# Patient Record
Sex: FEMALE | Race: BLACK OR AFRICAN AMERICAN | NOT HISPANIC OR LATINO | Employment: UNEMPLOYED | URBAN - METROPOLITAN AREA
[De-identification: names, ages, dates, MRNs, and addresses within clinical notes are randomized per-mention and may not be internally consistent; named-entity substitution may affect disease eponyms.]

---

## 2023-04-28 ENCOUNTER — OFFICE VISIT (OUTPATIENT)
Dept: PRIMARY CARE | Facility: CLINIC | Age: 68
End: 2023-04-28
Payer: COMMERCIAL

## 2023-04-28 VITALS
SYSTOLIC BLOOD PRESSURE: 110 MMHG | OXYGEN SATURATION: 94 % | BODY MASS INDEX: 36.99 KG/M2 | DIASTOLIC BLOOD PRESSURE: 74 MMHG | TEMPERATURE: 98.1 F | HEIGHT: 62 IN | HEART RATE: 89 BPM | WEIGHT: 201 LBS

## 2023-04-28 DIAGNOSIS — E11.69 TYPE 2 DIABETES MELLITUS WITH OTHER SPECIFIED COMPLICATION, WITHOUT LONG-TERM CURRENT USE OF INSULIN (MULTI): ICD-10-CM

## 2023-04-28 DIAGNOSIS — H66.93 BILATERAL OTITIS MEDIA, UNSPECIFIED OTITIS MEDIA TYPE: ICD-10-CM

## 2023-04-28 DIAGNOSIS — Z00.00 HEALTH CARE MAINTENANCE: Primary | ICD-10-CM

## 2023-04-28 PROCEDURE — 1036F TOBACCO NON-USER: CPT | Performed by: STUDENT IN AN ORGANIZED HEALTH CARE EDUCATION/TRAINING PROGRAM

## 2023-04-28 PROCEDURE — 99214 OFFICE O/P EST MOD 30 MIN: CPT | Performed by: STUDENT IN AN ORGANIZED HEALTH CARE EDUCATION/TRAINING PROGRAM

## 2023-04-28 PROCEDURE — 1160F RVW MEDS BY RX/DR IN RCRD: CPT | Performed by: STUDENT IN AN ORGANIZED HEALTH CARE EDUCATION/TRAINING PROGRAM

## 2023-04-28 PROCEDURE — 90750 HZV VACC RECOMBINANT IM: CPT | Performed by: STUDENT IN AN ORGANIZED HEALTH CARE EDUCATION/TRAINING PROGRAM

## 2023-04-28 PROCEDURE — 1159F MED LIST DOCD IN RCRD: CPT | Performed by: STUDENT IN AN ORGANIZED HEALTH CARE EDUCATION/TRAINING PROGRAM

## 2023-04-28 PROCEDURE — 3074F SYST BP LT 130 MM HG: CPT | Performed by: STUDENT IN AN ORGANIZED HEALTH CARE EDUCATION/TRAINING PROGRAM

## 2023-04-28 PROCEDURE — 1125F AMNT PAIN NOTED PAIN PRSNT: CPT | Performed by: STUDENT IN AN ORGANIZED HEALTH CARE EDUCATION/TRAINING PROGRAM

## 2023-04-28 PROCEDURE — 90471 IMMUNIZATION ADMIN: CPT | Performed by: STUDENT IN AN ORGANIZED HEALTH CARE EDUCATION/TRAINING PROGRAM

## 2023-04-28 PROCEDURE — 4010F ACE/ARB THERAPY RXD/TAKEN: CPT | Performed by: STUDENT IN AN ORGANIZED HEALTH CARE EDUCATION/TRAINING PROGRAM

## 2023-04-28 PROCEDURE — 3078F DIAST BP <80 MM HG: CPT | Performed by: STUDENT IN AN ORGANIZED HEALTH CARE EDUCATION/TRAINING PROGRAM

## 2023-04-28 RX ORDER — LISINOPRIL 5 MG/1
5 TABLET ORAL DAILY
COMMUNITY
End: 2023-04-28 | Stop reason: ALTCHOICE

## 2023-04-28 RX ORDER — BLOOD-GLUCOSE METER
KIT MISCELLANEOUS
COMMUNITY
Start: 2022-03-23 | End: 2023-08-15 | Stop reason: ALTCHOICE

## 2023-04-28 RX ORDER — ERYTHROMYCIN 20 MG/ML
SOLUTION TOPICAL
COMMUNITY
Start: 2022-01-06 | End: 2023-05-08 | Stop reason: SDUPTHER

## 2023-04-28 RX ORDER — DORZOLAMIDE HCL 20 MG/ML
SOLUTION/ DROPS OPHTHALMIC
COMMUNITY
Start: 2021-04-22

## 2023-04-28 RX ORDER — PREDNISOLONE ACETATE 10 MG/ML
SUSPENSION/ DROPS OPHTHALMIC
COMMUNITY
Start: 2023-04-12

## 2023-04-28 RX ORDER — LIDOCAINE 50 MG/G
PATCH TOPICAL SEE ADMIN INSTRUCTIONS
COMMUNITY
Start: 2021-04-06

## 2023-04-28 RX ORDER — GABAPENTIN 100 MG/1
100 CAPSULE ORAL 3 TIMES DAILY
COMMUNITY
Start: 2023-02-13 | End: 2023-04-28 | Stop reason: ALTCHOICE

## 2023-04-28 RX ORDER — PETROLATUM 1 G/G
OINTMENT TOPICAL
COMMUNITY
Start: 2021-05-17

## 2023-04-28 RX ORDER — AMOXICILLIN 875 MG/1
875 TABLET, FILM COATED ORAL EVERY 12 HOURS SCHEDULED
Status: SHIPPED | OUTPATIENT
Start: 2023-04-28 | End: 2023-05-03

## 2023-04-28 RX ORDER — GLIPIZIDE 5 MG/1
5 TABLET, FILM COATED, EXTENDED RELEASE ORAL DAILY
COMMUNITY
End: 2023-08-15 | Stop reason: ALTCHOICE

## 2023-04-28 RX ORDER — ACETAMINOPHEN 500 MG
50 TABLET ORAL DAILY
COMMUNITY
End: 2023-12-07 | Stop reason: SDUPTHER

## 2023-04-28 RX ORDER — BLOOD SUGAR DIAGNOSTIC
STRIP MISCELLANEOUS
COMMUNITY
Start: 2023-03-15 | End: 2023-08-15 | Stop reason: ALTCHOICE

## 2023-04-28 RX ORDER — LATANOPROST 50 UG/ML
SOLUTION/ DROPS OPHTHALMIC
COMMUNITY

## 2023-04-28 RX ORDER — METFORMIN HYDROCHLORIDE 500 MG/1
500 TABLET, EXTENDED RELEASE ORAL DAILY
COMMUNITY
End: 2023-12-04 | Stop reason: WASHOUT

## 2023-04-28 ASSESSMENT — PAIN SCALES - GENERAL: PAINLEVEL: 0-NO PAIN

## 2023-04-28 NOTE — PROGRESS NOTES
67 year old female with PMH significant for controlled T2DM, HTN, Vit D deficiency, shingles with postherpetic neuralgia, bilateral legal blindness and glaucoma of L eye is here for bl ear pain.    #Bl ear pain   - x 2 weeks   - recent cold/upper respiratory infection in the last 3 months   - OTC medication and lots of hydration: reports feeling better todays  - mild dry cough at this time   - denies discharge, tinnitus, or hearing loss  - denies N/V, F/C, SOB, chest pain, palpitation, abd pain, or change in BM    #HTN  #Dyslipidemia   - Compliant Lisinopril 5 mg QD   - Atorvastatin 40 mg   - Denies malaise, fever/chills, appetite changes, dizziness, vision changes, SOB, cough, chest pain, palpitations, edema, abdominal pain, N/V, changes in urine frequency/color, changes in stooling frequency/consistency/color.       #DMII  - A1C at 7.2 % Aug 15/22   - today am fasting glucose at home 282: but reports having lots of candy bars before bed; usually 150s  - Compliant with Metformin  mg Qd (due to GI symptoms) and Glipizide 5 mg   - Opthalmology follow up every 6 months   - Regular podiatry follow up   - Not taking Gabapentin anymore: took it only after Shingles years ago  - Today am fasting    - Denies hypoglycemic episodes   - Follows up with opthalmology and podiatry   - Neuropathy of feet; previously on 600 mg Gabapentin but has been off medication for months       REVIEW OF SYSTEMS:   No fevers, chills, weight is stable  No skin lesions  No HA, SZ, LOC  No CP, chest pressure  No cough, SOB   No ABD pain, nausea, vomiting  No urinary urgency, dysuria, urinary frequency  No BRBPR, melena, hematuria  No bleeding      PHYSICAL EXAMINATION:   Gen: NAD, non-toxic  HEENT: Bilateral tympanic membrane erythema with severe bilateral waxy   Chest: no inc WOB, CTABL, no wheeze/crackles/rhonchi   CVS: RRR, no murur appreciated   Abd: soft, NT/ND, +BS  Ext: no edema, nontender  Skin: Dry, warm, good  condition  Neuro: grossly normal, CN intact, LUIS x 4  Psy: Mood and affect appropriate    IO bilateral ear irrigation     ASSESSMENT:  67 year old female with PMH significant for uncontrolled T2DM, HTN, Vit D deficiency, shingles with postherpetic neuralgia, bilateral legal blindness and glaucoma of L eye is here for DM and HTN f/u. Patient HDS with reassuring findings on physical examination. Detail plan as below:       PLAN:  #bL ear pain  - Otitis media bilaterally   - Will send for high dose Amoxicillin 875 mg BID      #HTN  #Dyslipidemia   - BP at goal of <130/80   - RFP appropriate on 4/29/22  - Will discontinue Lisinopril at this time in setting of appropriate low urine protein and soft BP since 2022  - Atorvastatin 40 mg daily   - Discussed low sodium diet and daily activity     #DMII  - A1C and urine protein repeat   - Will continue with Metformin 500mg every day and Glipizide 5 mg daily until new lab work up   - Will defer Gabapentin at this time as per request   - Discussed hypoglycemic protocols    #Maintenance   - 2nd dose Zoster vaccivnation   - Medications reviewed and renewed as needed   - Precautionary returns reviewed     RTC: 2 weeks for BP follow up    Seen and discussed with Dr. Ferdinand Sanchez MD  Family Medicine, PGY3  [facility]

## 2023-05-04 NOTE — PROGRESS NOTES
I saw and evaluated the patient. I personally obtained the key and critical portions of the history and physical exam or was physically present for key and critical portions performed by the resident/fellow. I reviewed the resident/fellow's documentation and discussed the patient with the resident/fellow. I agree with the resident/fellow's medical decision making as documented in the note.    Tarun Streeter MD

## 2023-05-08 DIAGNOSIS — H66.93 BILATERAL OTITIS MEDIA, UNSPECIFIED OTITIS MEDIA TYPE: Primary | ICD-10-CM

## 2023-05-10 RX ORDER — ERYTHROMYCIN 20 MG/ML
SOLUTION TOPICAL
Qty: 60 ML | Refills: 0 | Status: SHIPPED | OUTPATIENT
Start: 2023-05-10

## 2023-06-28 DIAGNOSIS — I10 HYPERTENSION, UNSPECIFIED TYPE: Primary | ICD-10-CM

## 2023-06-28 RX ORDER — LISINOPRIL 5 MG/1
5 TABLET ORAL DAILY
Qty: 90 TABLET | Refills: 3 | Status: SHIPPED | OUTPATIENT
Start: 2023-06-28 | End: 2023-08-15 | Stop reason: ALTCHOICE

## 2023-08-15 ENCOUNTER — OFFICE VISIT (OUTPATIENT)
Dept: PRIMARY CARE | Facility: CLINIC | Age: 68
End: 2023-08-15
Payer: COMMERCIAL

## 2023-08-15 VITALS
DIASTOLIC BLOOD PRESSURE: 82 MMHG | WEIGHT: 210 LBS | SYSTOLIC BLOOD PRESSURE: 126 MMHG | TEMPERATURE: 96.1 F | HEIGHT: 62 IN | BODY MASS INDEX: 38.64 KG/M2 | HEART RATE: 80 BPM | OXYGEN SATURATION: 95 %

## 2023-08-15 DIAGNOSIS — E11.69 TYPE 2 DIABETES MELLITUS WITH OTHER SPECIFIED COMPLICATION, WITHOUT LONG-TERM CURRENT USE OF INSULIN (MULTI): Primary | ICD-10-CM

## 2023-08-15 DIAGNOSIS — Z12.31 SCREENING MAMMOGRAM FOR BREAST CANCER: ICD-10-CM

## 2023-08-15 DIAGNOSIS — Z23 IMMUNIZATION DUE: ICD-10-CM

## 2023-08-15 PROCEDURE — 90750 HZV VACC RECOMBINANT IM: CPT

## 2023-08-15 PROCEDURE — 3079F DIAST BP 80-89 MM HG: CPT

## 2023-08-15 PROCEDURE — 1125F AMNT PAIN NOTED PAIN PRSNT: CPT

## 2023-08-15 PROCEDURE — 3074F SYST BP LT 130 MM HG: CPT

## 2023-08-15 PROCEDURE — 90471 IMMUNIZATION ADMIN: CPT

## 2023-08-15 PROCEDURE — 1159F MED LIST DOCD IN RCRD: CPT

## 2023-08-15 PROCEDURE — 99214 OFFICE O/P EST MOD 30 MIN: CPT

## 2023-08-15 PROCEDURE — 1036F TOBACCO NON-USER: CPT

## 2023-08-15 PROCEDURE — 3052F HG A1C>EQUAL 8.0%<EQUAL 9.0%: CPT

## 2023-08-15 PROCEDURE — 1160F RVW MEDS BY RX/DR IN RCRD: CPT

## 2023-08-15 RX ORDER — ATORVASTATIN CALCIUM 40 MG/1
40 TABLET, FILM COATED ORAL NIGHTLY
COMMUNITY
Start: 2023-05-11 | End: 2024-03-14 | Stop reason: SDUPTHER

## 2023-08-15 RX ORDER — LANCETS
EACH MISCELLANEOUS
Qty: 100 EACH | Refills: 3 | Status: SHIPPED | OUTPATIENT
Start: 2023-08-15 | End: 2023-12-07 | Stop reason: SDUPTHER

## 2023-08-15 RX ORDER — BLOOD-GLUCOSE METER
KIT MISCELLANEOUS
Qty: 270 STRIP | Refills: 3 | Status: SHIPPED | OUTPATIENT
Start: 2023-08-15

## 2023-08-15 RX ORDER — SITAGLIPTIN AND METFORMIN HYDROCHLORIDE 500; 50 MG/1; MG/1
1 TABLET, FILM COATED ORAL
Qty: 180 TABLET | Refills: 0 | Status: SHIPPED | OUTPATIENT
Start: 2023-08-15 | End: 2023-12-04 | Stop reason: WASHOUT

## 2023-08-15 ASSESSMENT — PAIN SCALES - GENERAL: PAINLEVEL: 7

## 2023-08-15 ASSESSMENT — ENCOUNTER SYMPTOMS
DIARRHEA: 0
VOMITING: 0
CONSTIPATION: 0
DIFFICULTY URINATING: 0
UNEXPECTED WEIGHT CHANGE: 0
COUGH: 0
FEVER: 0
JOINT SWELLING: 1
EYES NEGATIVE: 1
CHEST TIGHTNESS: 0
NAUSEA: 0
FREQUENCY: 0
FATIGUE: 0
CHILLS: 0
SHORTNESS OF BREATH: 0
DYSURIA: 0

## 2023-08-15 NOTE — PROGRESS NOTES
"Subjective   Patient ID: Jeannine Moreno is a 68 y.o. female who presents for Follow-up (Pt states she needs a mammogram. ).    68 year old female with PMHx of controlled T2DM, HTN, Vit D deficiency, shingles with postherpetic neuralgia, bilateral legal blindness and glaucoma of L eye presents for general follow-up.    #Diabetes  -last checked her levels this morning at 8 after eating (blueberry muffin and cereal) and it was 302  -before today, she had last checked her glucose levels one month ago and it was in the 150s  -has symptoms of diabetic neuropathy; goes to podiatrist (last visit 1 months ago) but is currently not on any medications  -missed her last ophthalmology visit but has rescheduled that for October  -did not get labs from last visit (A1c, urine protein)  -stopped metformin and glipizide d/t conern for interaction with lipozene (weight loss supplement she saw on TV). Pt started taking lipozene.   -pt needs lancet refill and test strip refill     #Ear pain on L side  -during the last visit, she had bilateral ear pain and was prescribed antibiotics that she was unable to get from the pharmacy  -unilateral pain on L side started 1 week ago  -hearing is intact and pain comes and goes  -2-3/10 on worst day  - Denies hearing loss, no ear discharge    #Knee pain  #Bilateral Shoulder pain  -PT going well but there is some persistent pain  -describes pain as \"soreness\", 2-3/10 today  -continues to be managed by orthopedics  -takes 2 Tylenol tablets sometimes to manage the pain    #Cholesterol  -pt stopped taking Lipitor d/t concern of interaction with Lipozene (weight loss supplement she saw on TV that she has been taking)    #Hx of HTN  -not on meds currently because she was taken off Lisinopril last visit  -BP today is 126/82           Review of Systems   Constitutional:  Negative for chills, fatigue, fever and unexpected weight change.   HENT:  Positive for ear pain (on L side). Negative for ear discharge " "and hearing loss.    Eyes: Negative.    Respiratory:  Negative for cough, chest tightness and shortness of breath.    Cardiovascular:  Negative for chest pain.   Gastrointestinal:  Negative for constipation, diarrhea, nausea and vomiting.   Endocrine: Negative for polyuria.   Genitourinary:  Negative for difficulty urinating, dysuria and frequency.   Musculoskeletal:  Positive for joint swelling (in shoulders).       Objective   /82 (BP Location: Left arm, Patient Position: Sitting)   Pulse 80   Temp 35.6 °C (96.1 °F) (Temporal)   Ht 1.575 m (5' 2\")   Wt 95.3 kg (210 lb)   SpO2 95%   BMI 38.41 kg/m²     Physical Exam  Constitutional:       Appearance: She is obese.   HENT:      Head: Normocephalic and atraumatic.      Right Ear: Tympanic membrane, ear canal and external ear normal.      Left Ear: Tympanic membrane, ear canal and external ear normal.   Eyes:      Comments: Wearing glasses   Cardiovascular:      Rate and Rhythm: Normal rate and regular rhythm.      Heart sounds: No murmur heard.     No friction rub. No gallop.   Pulmonary:      Effort: Pulmonary effort is normal.      Breath sounds: Normal breath sounds.   Abdominal:      General: Abdomen is flat. Bowel sounds are normal.      Palpations: Abdomen is soft.   Skin:     General: Skin is warm.      Capillary Refill: Capillary refill takes less than 2 seconds.   Neurological:      Mental Status: She is alert and oriented to person, place, and time.   Psychiatric:         Mood and Affect: Mood normal.         Behavior: Behavior normal.         Assessment/Plan   Problem List Items Addressed This Visit    None  Visit Diagnoses       Type 2 diabetes mellitus with other specified complication, without long-term current use of insulin (CMS/[facility])    -  Primary    Relevant Medications    SITagliptin phos-metformin (Janumet)  mg tablet    lancets misc    blood sugar diagnostic (Easymax 15 test strips) strip    Other Relevant Orders    Renal " function panel    Lipid panel    Albumin, urine, random    Follow Up In Primary Care    Screening mammogram for breast cancer        Relevant Orders    BI mammo bilateral screening tomosynthesis    Immunization due        Relevant Orders    Zoster vaccine, recombinant, adult (SHINGRIX) (Completed)          Assessment:    Ms. Moreno is a 68 year old female with PMHx of controlled T2DM, HTN, Vit D deficiency, shingles with postherpetic neuralgia, bilateral legal blindness and glaucoma of L eye who presents for general follow-up. Clinically stable. Pt has not been taking her diabetes and HLD medications as prescribed d/t her taking Lipozene (weight loss medication) that she had seen on TV. Additionally, she does not regularly check her glucose levels at home. She did not get her A1c and urine protein levels last time she was at the clinic, so she needs to follow-up on that, along with getting a RFP, Lipid panel, and routine mammogram. Presently, she denies any worsening neuropathic pain in her feet as managed by her podiatrist (last appt 1 week ago), and is following up with her ophto in October. Pt was advised to continue to monitor glucose levels at home.     #Diabetes  #Obesity  - Discussed option of starting ozempic for both wt loss and diabetes management. But pt does not feel comfortable using injection.   -switched metformin and glipizide to Janumet (discontinued glipizide mainly d/t risk of hypoglycemia in case of incorrect medication usage since pt is legally blind and relies on blister pack for correct medication administration)   -informed patient to continue taking current medications until new ones are dispensed to her  -refilled lancet and testing strips  -advised patient to regularly check glucose levels before meals  -obtain A1c, urine protein levels, and RFP    #Knee pain  #Bilateral shoulder pain  -asked pt to continue to monitor symptoms and take tylenol PRN    #Cholesterol  -advised pt to stop  Lipozene and cont to take Lipitor 40mg as prescribed  -obtain yearly lipid panel    #Hx of HTN  -BP is appropriate so we can continue to hold meds    #Health Maintenance  -ordered mammogram since last one was 2 years ago    Seen and discussed with Dr Streeter     I saw and evaluated the patient with the medical student. I personally obtained the key and critical portions of the history and physical exam. I reviewed and modified the student's documentation and discussed patient with the medical student. I agree with the above documentation and medical decision making.    Zach Olivares MD   Family Medicine PGY2

## 2023-08-16 NOTE — PROGRESS NOTES
"Subjective   Patient ID: Jeannine Moreno is a 68 y.o. female who presents for Follow-up (Pt states she needs a mammogram. ).    HPI     Review of Systems    Objective   /82 (BP Location: Left arm, Patient Position: Sitting)   Pulse 80   Temp 35.6 °C (96.1 °F) (Temporal)   Ht 1.575 m (5' 2\")   Wt 95.3 kg (210 lb)   SpO2 95%   BMI 38.41 kg/m²     Physical Exam    Assessment/Plan   {Assess/PlanSmartLinks:14083}       "

## 2023-08-17 ENCOUNTER — LAB (OUTPATIENT)
Dept: LAB | Facility: LAB | Age: 68
End: 2023-08-17
Payer: COMMERCIAL

## 2023-08-17 DIAGNOSIS — E11.69 TYPE 2 DIABETES MELLITUS WITH OTHER SPECIFIED COMPLICATION, WITHOUT LONG-TERM CURRENT USE OF INSULIN (MULTI): ICD-10-CM

## 2023-08-17 LAB
ALBUMIN (G/DL) IN SER/PLAS: 3.5 G/DL (ref 3.4–5)
ALBUMIN (MG/L) IN URINE: 8.7 MG/L
ALBUMIN/CREATININE (UG/MG) IN URINE: 6.6 UG/MG CRT (ref 0–30)
ANION GAP IN SER/PLAS: 14 MMOL/L (ref 10–20)
C REACTIVE PROTEIN (MG/L) IN SER/PLAS: 2.52 MG/DL
CALCIUM (MG/DL) IN SER/PLAS: 8.9 MG/DL (ref 8.6–10.6)
CARBON DIOXIDE, TOTAL (MMOL/L) IN SER/PLAS: 23 MMOL/L (ref 21–32)
CHLORIDE (MMOL/L) IN SER/PLAS: 105 MMOL/L (ref 98–107)
CHOLESTEROL (MG/DL) IN SER/PLAS: 166 MG/DL (ref 0–199)
CHOLESTEROL IN HDL (MG/DL) IN SER/PLAS: 48.1 MG/DL
CHOLESTEROL/HDL RATIO: 3.5
CREATININE (MG/DL) IN SER/PLAS: 0.6 MG/DL (ref 0.5–1.05)
CREATININE (MG/DL) IN URINE: 131 MG/DL (ref 20–320)
ESTIMATED AVERAGE GLUCOSE FOR HBA1C: 192 MG/DL
GFR FEMALE: >90 ML/MIN/1.73M2
GLUCOSE (MG/DL) IN SER/PLAS: 213 MG/DL (ref 74–99)
HEMOGLOBIN A1C/HEMOGLOBIN TOTAL IN BLOOD: 8.3 %
LDL: 91 MG/DL (ref 0–99)
PHOSPHATE (MG/DL) IN SER/PLAS: 3.5 MG/DL (ref 2.5–4.9)
POTASSIUM (MMOL/L) IN SER/PLAS: 4 MMOL/L (ref 3.5–5.3)
SEDIMENTATION RATE, ERYTHROCYTE: 25 MM/H (ref 0–30)
SODIUM (MMOL/L) IN SER/PLAS: 138 MMOL/L (ref 136–145)
TRIGLYCERIDE (MG/DL) IN SER/PLAS: 135 MG/DL (ref 0–149)
UREA NITROGEN (MG/DL) IN SER/PLAS: 13 MG/DL (ref 6–23)
VLDL: 27 MG/DL (ref 0–40)

## 2023-08-17 PROCEDURE — 80061 LIPID PANEL: CPT

## 2023-08-17 PROCEDURE — 82043 UR ALBUMIN QUANTITATIVE: CPT

## 2023-08-17 PROCEDURE — 83036 HEMOGLOBIN GLYCOSYLATED A1C: CPT

## 2023-08-17 PROCEDURE — 82570 ASSAY OF URINE CREATININE: CPT

## 2023-08-17 PROCEDURE — 36415 COLL VENOUS BLD VENIPUNCTURE: CPT

## 2023-08-17 PROCEDURE — 80069 RENAL FUNCTION PANEL: CPT

## 2023-08-23 NOTE — PROGRESS NOTES
"Subjective   Patient ID: Jeannine Moreno is a 68 y.o. female MHx of controlled T2DM, HTN, Vit D deficiency, shingles with postherpetic neuralgia, bilateral legal blindness and glaucoma of L eye who presents for Follow-up (Pt states she needs a mammogram. ).    HPI     #Diabetes  -last checked her levels this morning at 8 after eating (blueberry muffin and cereal) and it was 302  -before today, she had last checked her glucose levels one month ago and it was in the 150s  -has symptoms of diabetic neuropathy; goes to podiatrist (last visit 1 months ago) but is currently not on any medications  -missed her last ophthalmology visit but has rescheduled that for October  -did not get labs from last visit (A1c, urine protein)  -stopped metformin and glipizide d/t concern for interaction with lipozene (weight loss supplement she saw on TV). Pt started taking lipozene.   -pt needs lancet refill and test strip refill      #Ear pain on L side  -during the last visit, she had bilateral ear pain and was prescribed antibiotics that she was unable to get from the pharmacy  -unilateral pain on L side started 1 week ago  -hearing is intact and pain comes and goes  -2-3/10 on worst day  - Denies hearing loss, no ear discharge     #Knee pain  #Bilateral Shoulder pain  -PT going well but there is some persistent pain  -describes pain as \"soreness\", 2-3/10 today  -continues to be managed by orthopedics  -takes 2 Tylenol tablets sometimes to manage the pain     #Cholesterol  -pt stopped taking Lipitor d/t concern of interaction with Lipozene (weight loss supplement she saw on TV that she has been taking)     #Hx of HTN  -not on meds currently because she was taken off Lisinopril last visit  -BP today is 126/82       Review of Systems  12pt ROS negative except as mentioned in HPI     Objective   /82 (BP Location: Left arm, Patient Position: Sitting)   Pulse 80   Temp 35.6 °C (96.1 °F) (Temporal)   Ht 1.575 m (5' 2\")   Wt 95.3 " kg (210 lb)   SpO2 95%   BMI 38.41 kg/m²     Physical Exam  Constitutional:       Appearance: She is obese.   HENT:      Right Ear: Tympanic membrane and ear canal normal.      Left Ear: Tympanic membrane and ear canal normal.   Eyes:      Comments: Legally blind, wearing glasses   Cardiovascular:      Rate and Rhythm: Normal rate and regular rhythm.      Pulses: Normal pulses.      Heart sounds: Normal heart sounds.   Pulmonary:      Effort: Pulmonary effort is normal.      Breath sounds: Normal breath sounds.   Abdominal:      General: Abdomen is flat. Bowel sounds are normal.      Palpations: Abdomen is soft.   Musculoskeletal:         General: No swelling or tenderness.   Skin:     General: Skin is warm.      Capillary Refill: Capillary refill takes less than 2 seconds.   Neurological:      Mental Status: Mental status is at baseline.   Psychiatric:         Mood and Affect: Mood normal.         Behavior: Behavior normal.         Assessment/Plan     Ms. Moreno is a 68 year old female with PMHx of controlled T2DM, HTN, Vit D deficiency, shingles with postherpetic neuralgia, bilateral legal blindness and glaucoma of L eye who presents for general follow-up. Clinically stable. Pt has not been taking her diabetes and HLD medications as prescribed d/t her taking Lipozene (weight loss medication) that she had seen on TV. Additionally, she does not regularly check her glucose levels at home. She did not get her A1c and urine protein levels last time she was at the clinic, so she needs to follow-up on that, along with getting a RFP, Lipid panel, and routine mammogram. Presently, she denies any worsening neuropathic pain in her feet as managed by her podiatrist (last appt 1 week ago), and is following up with her ophto in October. Pt was advised to continue to monitor glucose levels at home.      #Diabetes  #Obesity  - Discussed option of starting ozempic for both wt loss and diabetes management. But pt does not feel  comfortable using injection.   -switched metformin and glipizide to Janumet (discontinued glipizide mainly d/t risk of hypoglycemia in case of incorrect medication usage since pt is legally blind and relies on blister pack for correct medication administration)   -informed patient to continue taking current medications until new ones are dispensed to her  -refilled lancet and testing strips  -advised patient to regularly check glucose levels before meals  -obtain A1c, urine protein levels, and RFP     #Knee pain  #Bilateral shoulder pain  -asked pt to continue to monitor symptoms and take tylenol PRN     #Cholesterol  -advised pt to stop Lipozene and cont to take Lipitor 40mg as prescribed  -obtain yearly lipid panel     #Hx of HTN  -BP within normal range (off medication)      #Health Maintenance  -ordered mammogram since last one was 2 years ago  - shingrix completed     RTC 3 months for follow up.      Seen and discussed with Dr Ferdinand Main  MS3      I saw and evaluated the patient with the medical student. I personally obtained the key and critical portions of the history and physical exam. I reviewed and modified the student's documentation and discussed patient with the medical student. I agree with the above documentation and medical decision making.     Zach Olivares MD   Family Medicine PGY2           Problem List Items Addressed This Visit    None  Visit Diagnoses       Type 2 diabetes mellitus with other specified complication, without long-term current use of insulin (CMS/[facility])    -  Primary    Relevant Medications    SITagliptin phos-metformin (Janumet)  mg tablet    lancets misc    blood sugar diagnostic (Easymax 15 test strips) strip    Other Relevant Orders    Renal function panel (Completed)    Lipid panel (Completed)    Albumin, urine, random (Completed)    Follow Up In Primary Care    Screening mammogram for breast cancer        Relevant Orders    BI mammo bilateral  screening tomosynthesis    Immunization due        Relevant Orders    Zoster vaccine, recombinant, adult (SHINGRIX) (Completed)

## 2023-09-01 ENCOUNTER — PATIENT OUTREACH (OUTPATIENT)
Dept: CARE COORDINATION | Facility: CLINIC | Age: 68
End: 2023-09-01
Payer: COMMERCIAL

## 2023-09-01 PROBLEM — E55.9 MILD VITAMIN D DEFICIENCY: Status: ACTIVE | Noted: 2023-09-01

## 2023-09-01 PROBLEM — R10.30 LOWER ABDOMINAL PAIN OF UNKNOWN ETIOLOGY: Status: ACTIVE | Noted: 2023-09-01

## 2023-09-01 PROBLEM — I10 HYPERTENSION, ESSENTIAL: Status: ACTIVE | Noted: 2023-09-01

## 2023-09-01 PROBLEM — H54.7 BLINDNESS: Status: ACTIVE | Noted: 2023-09-01

## 2023-09-01 PROBLEM — B02.9 SHINGLES: Status: ACTIVE | Noted: 2023-09-01

## 2023-09-01 PROBLEM — L98.8 SKIN LESION OF BREAST: Status: ACTIVE | Noted: 2023-09-01

## 2023-09-01 PROBLEM — E78.5 HYPERLIPIDEMIA: Status: ACTIVE | Noted: 2023-09-01

## 2023-09-01 PROBLEM — R30.0 DYSURIA: Status: ACTIVE | Noted: 2023-09-01

## 2023-09-01 PROBLEM — B02.29 POST HERPETIC NEURALGIA: Status: ACTIVE | Noted: 2023-09-01

## 2023-09-01 PROBLEM — T30.0 BURN INJURY: Status: ACTIVE | Noted: 2023-09-01

## 2023-09-01 PROBLEM — E11.9 DM2 (DIABETES MELLITUS, TYPE 2) (MULTI): Status: ACTIVE | Noted: 2023-09-01

## 2023-09-01 PROBLEM — G62.9 NEUROPATHY: Status: ACTIVE | Noted: 2023-09-01

## 2023-09-01 PROBLEM — E66.9 CLASS 2 OBESITY: Status: ACTIVE | Noted: 2023-09-01

## 2023-09-01 PROBLEM — E66.812 CLASS 2 OBESITY: Status: ACTIVE | Noted: 2023-09-01

## 2023-09-01 RX ORDER — INSULIN PUMP SYRINGE, 3 ML
1 EACH MISCELLANEOUS AS NEEDED
COMMUNITY
End: 2023-12-04

## 2023-09-01 RX ORDER — LISINOPRIL 5 MG/1
5 TABLET ORAL DAILY
COMMUNITY
Start: 2023-08-16 | End: 2024-03-11 | Stop reason: SDUPTHER

## 2023-09-01 RX ORDER — GLIPIZIDE 5 MG/1
5 TABLET, FILM COATED, EXTENDED RELEASE ORAL DAILY
COMMUNITY
End: 2023-12-04 | Stop reason: WASHOUT

## 2023-09-01 RX ORDER — ACETAMINOPHEN 500 MG
TABLET ORAL
COMMUNITY
End: 2024-03-11 | Stop reason: WASHOUT

## 2023-09-01 RX ORDER — SITAGLIPTIN AND METFORMIN HYDROCHLORIDE 1000; 50 MG/1; MG/1
1 TABLET, FILM COATED ORAL
COMMUNITY
Start: 2023-08-16 | End: 2023-12-07 | Stop reason: SDUPTHER

## 2023-09-01 RX ORDER — DORZOLAMIDE HYDROCHLORIDE AND TIMOLOL MALEATE 20; 5 MG/ML; MG/ML
1 SOLUTION/ DROPS OPHTHALMIC 2 TIMES DAILY
COMMUNITY

## 2023-09-01 RX ORDER — LANCETS 33 GAUGE
EACH MISCELLANEOUS
COMMUNITY
Start: 2023-08-16 | End: 2023-12-04 | Stop reason: ALTCHOICE

## 2023-09-01 RX ORDER — GABAPENTIN 100 MG/1
100 CAPSULE ORAL 3 TIMES DAILY
COMMUNITY

## 2023-09-01 NOTE — PROGRESS NOTES
Called pt verified name and .    Offered pt  [facility] events.    Pt not interested and would prefer to have Mammo done at hospital instead, has order from PCP and will call to schedule when she is ready.

## 2023-10-09 ENCOUNTER — HOSPITAL ENCOUNTER (OUTPATIENT)
Dept: RADIOLOGY | Facility: HOSPITAL | Age: 68
Discharge: HOME | End: 2023-10-09
Payer: COMMERCIAL

## 2023-10-09 ENCOUNTER — OFFICE VISIT (OUTPATIENT)
Dept: ORTHOPEDIC SURGERY | Facility: HOSPITAL | Age: 68
End: 2023-10-09
Payer: COMMERCIAL

## 2023-10-09 DIAGNOSIS — M79.662 PAIN OF LEFT LOWER LEG: ICD-10-CM

## 2023-10-09 PROBLEM — Z96.653 STATUS POST BILATERAL KNEE REPLACEMENTS: Status: ACTIVE | Noted: 2023-10-09

## 2023-10-09 PROCEDURE — 73560 X-RAY EXAM OF KNEE 1 OR 2: CPT | Mod: LT,FY

## 2023-10-09 PROCEDURE — 1159F MED LIST DOCD IN RCRD: CPT | Performed by: ORTHOPAEDIC SURGERY

## 2023-10-09 PROCEDURE — 1036F TOBACCO NON-USER: CPT | Performed by: ORTHOPAEDIC SURGERY

## 2023-10-09 PROCEDURE — 1125F AMNT PAIN NOTED PAIN PRSNT: CPT | Performed by: ORTHOPAEDIC SURGERY

## 2023-10-09 PROCEDURE — 1160F RVW MEDS BY RX/DR IN RCRD: CPT | Performed by: ORTHOPAEDIC SURGERY

## 2023-10-09 PROCEDURE — 99213 OFFICE O/P EST LOW 20 MIN: CPT | Performed by: ORTHOPAEDIC SURGERY

## 2023-10-09 PROCEDURE — 73560 X-RAY EXAM OF KNEE 1 OR 2: CPT | Mod: LEFT SIDE | Performed by: STUDENT IN AN ORGANIZED HEALTH CARE EDUCATION/TRAINING PROGRAM

## 2023-10-09 PROCEDURE — 3052F HG A1C>EQUAL 8.0%<EQUAL 9.0%: CPT | Performed by: ORTHOPAEDIC SURGERY

## 2023-10-09 PROCEDURE — 4010F ACE/ARB THERAPY RXD/TAKEN: CPT | Performed by: ORTHOPAEDIC SURGERY

## 2023-10-09 NOTE — PROGRESS NOTES
Please see previous notes.  The patient is a 68-year-old female who is blind and had bilateral total knee replacements done 10 years ago at [facility].  The facility is no longer open so she needs somebody to follow her for her knees.  She was having some buckling episodes with her left knee and some pain which I thought was a lateral tibial tendinitis.  We sent her to physical therapy and this really helped her tremendously.  He does not have any buckling or any pain in the knee at all.    Her physical exam reveals she has full knee extension almost 120 degrees knee flexion knee is stable anterior posteriorly medial laterally she has no knee swelling.    I did repeat x-rays today which shows a good lateral of the knee in full extension there is no anterior posteriorly subluxation.  The AP shows good alignment with no evidence of wear or loosening.    Assessment bilateral total knee replacements done 10 years ago doing well.    Plan follow-up in 1 year routinely.

## 2023-12-04 ENCOUNTER — OFFICE VISIT (OUTPATIENT)
Dept: PRIMARY CARE | Facility: CLINIC | Age: 68
End: 2023-12-04
Payer: COMMERCIAL

## 2023-12-04 ENCOUNTER — LAB (OUTPATIENT)
Dept: LAB | Facility: LAB | Age: 68
End: 2023-12-04
Payer: COMMERCIAL

## 2023-12-04 VITALS
DIASTOLIC BLOOD PRESSURE: 87 MMHG | OXYGEN SATURATION: 94 % | HEIGHT: 62 IN | HEART RATE: 109 BPM | WEIGHT: 209 LBS | BODY MASS INDEX: 38.46 KG/M2 | SYSTOLIC BLOOD PRESSURE: 129 MMHG | TEMPERATURE: 96.4 F

## 2023-12-04 DIAGNOSIS — E11.69 TYPE 2 DIABETES MELLITUS WITH OTHER SPECIFIED COMPLICATION, WITHOUT LONG-TERM CURRENT USE OF INSULIN (MULTI): Primary | ICD-10-CM

## 2023-12-04 DIAGNOSIS — E11.69 TYPE 2 DIABETES MELLITUS WITH OTHER SPECIFIED COMPLICATION, WITHOUT LONG-TERM CURRENT USE OF INSULIN (MULTI): ICD-10-CM

## 2023-12-04 LAB
EST. AVERAGE GLUCOSE BLD GHB EST-MCNC: 212 MG/DL
HBA1C MFR BLD: 9 %

## 2023-12-04 PROCEDURE — 83036 HEMOGLOBIN GLYCOSYLATED A1C: CPT

## 2023-12-04 PROCEDURE — 3052F HG A1C>EQUAL 8.0%<EQUAL 9.0%: CPT

## 2023-12-04 PROCEDURE — 3079F DIAST BP 80-89 MM HG: CPT

## 2023-12-04 PROCEDURE — 3074F SYST BP LT 130 MM HG: CPT

## 2023-12-04 PROCEDURE — 4010F ACE/ARB THERAPY RXD/TAKEN: CPT

## 2023-12-04 PROCEDURE — 36415 COLL VENOUS BLD VENIPUNCTURE: CPT

## 2023-12-04 PROCEDURE — 99213 OFFICE O/P EST LOW 20 MIN: CPT

## 2023-12-04 PROCEDURE — 1160F RVW MEDS BY RX/DR IN RCRD: CPT

## 2023-12-04 PROCEDURE — 1036F TOBACCO NON-USER: CPT

## 2023-12-04 PROCEDURE — 1126F AMNT PAIN NOTED NONE PRSNT: CPT

## 2023-12-04 PROCEDURE — 1159F MED LIST DOCD IN RCRD: CPT

## 2023-12-04 ASSESSMENT — PATIENT HEALTH QUESTIONNAIRE - PHQ9
1. LITTLE INTEREST OR PLEASURE IN DOING THINGS: NOT AT ALL
SUM OF ALL RESPONSES TO PHQ9 QUESTIONS 1 AND 2: 0
2. FEELING DOWN, DEPRESSED OR HOPELESS: NOT AT ALL

## 2023-12-04 ASSESSMENT — ENCOUNTER SYMPTOMS
DEPRESSION: 0
OCCASIONAL FEELINGS OF UNSTEADINESS: 1
LOSS OF SENSATION IN FEET: 1

## 2023-12-04 ASSESSMENT — PAIN SCALES - GENERAL: PAINLEVEL: 0-NO PAIN

## 2023-12-04 NOTE — PATIENT INSTRUCTIONS
Thank you for coming in to see us today, Ms. Jeannine Moreno! It was a pleasure managing your health together.    Today in clinic, we discussed     If we ordered bloodwork today, you can get this done at ANY  location and the results will come to me directly. The [facility] here at [facility] are walk-in (no appointment needed); [facility]'s hours are M-F 6:30a-6p and Sat 8a-12p.    If you have any questions/concerns, you can always use [employer] to message me directly. Or if you prefer, you can call the office at [phone]; if you leave a message, the office staff should translate this to a message in my inbox.    I'm looking forward to seeing you back in clinic in 3 monthjs to discuss your DM.    Best,  Dr. Rossi

## 2023-12-04 NOTE — PROGRESS NOTES
Patient ID: Jeannine Moreno is a 68 y.o. female who presents for Health Care Maintenance Visit  Ms. Moreno is a 68 year old female with PMHx of controlled T2DM, HTN, Vit D deficiency, shingles with postherpetic neuralgia, bilateral legal blindness and glaucoma of L eye who presents for follow-up on A1c.     Concerns: none    # DM II   - A1c of 8.3 in 8/2023   - pt uses easy max but pt keeps getting sent the one touch strips. She states that she had reached out to the pharmacy but they did not understand her concern.   - x 1 month since she has not been taking Janumet. Pt now has her medication.   - Pt asked about whether she wants a home aid. She states that she does not want a home aid as she is concerned that the home aid will steal things and she has had a bad reaction with the   - pt sees podiatry every 90 days.   - pt has eye clinic appointment coming up and stays compliant with her appointments.   - pt states that she does not stick with a diabetic diet. She will eat twice a day.   - pt prepares her own food and will often buy food as well. Pt does not stick to a specific diabetic diet. Pt defers from seeing a dietician.     #left-sided flank pain  - tylenol helped the pain.  - states that it only hurts when sitting. Not when standing.   - throbbing pain. She states that the pain is similar to the postherpetic neuralgia pain that she has had in the past.     OBGynHx:  - bilateral legal blindness. Pt not able to discern whether there is any post-menopausal bleeding.     Preventative:  -Last Pap (21-65): n/a   -Last mammogram: 9/15/2023   -Last Colonoscopy (50-75): 2016  -Last LDCT (55-80): n/a  -Last Eye exam: recommended follow up  -Last DEXA (65+F): n/a  -Diet: pt not on any specific diet.   -Seat Belt Use: always    SoHx:  -Living Situation: lives by herself. She had a bf who is currently in a nursing facility   -School/Employment: does not work   -Substance: no T/D, 2-3 drinks/week,  -Abuse:  denies    Immunizations:  -Flu vaccine: defers   -Pneuomvax (PPSV23):   -Prevnar (PCV13): not indicated  -Tdap: 2/10/23   -Shingrix(50+): 8/15/23     REVIEW OF SYSTEMS:  -No fevers, chills, weight is stable  -No sores, ulcers, rashes, skin lesions  -No HA, SZ, syncope, stroke, TIA  -No CP, chest pressure  -No cough, SOB  -No ABD pain, nausea, vomiting  -No urinary urgency, dysuria, urinary frequency  -No edema  -Organ systems reviewed & negative, except as mentioned in HPI    PMH, PSH, SoHx, FamHx and Medication reviewed and edited as indicated.     PHYSICAL EXAM:  -General:  Well developed. Alert. No acute distress.  -Skin:  warm, dry   -HEENT: NCAT, MMM   -Cardiovascular: RRR, no M/G/R   -Pulmonary:  CTABL   -Abdomen:  (+) BS, no gross deformities   -Neurologic:  no focal neurological deficits   -Musculoskeletal:  moves extremities spontaneously   -Psychiatric: pt answers questions appropriately.     ASSESSMENT:  68 year old F here for RHM and for DM-II follow-up. The pt has been without medications x 1 month. Pt appears to have difficulties with coordinating her self-care. Will reach out to SW/care coordinator to allow greater ease of care for pt given  the difficulty with taking her medications due to her blindness.     PLAN:    # DMII   - previous A1c--> 8.3 ( 9/2023)   - ordered HbA1c   - called patient's pharmacy to request easy max lancets instead of the one touch that does not fit into the patient's glucometer.   - reached out to  about coordinating care as pt has difficulty taking meds.     Discussed with attending, Dr. Mini Rossi MD, MPH   Family Medicine and Preventive Health, PGY-2

## 2023-12-05 ENCOUNTER — HOSPITAL ENCOUNTER (EMERGENCY)
Facility: HOSPITAL | Age: 68
Discharge: HOME | End: 2023-12-05
Payer: COMMERCIAL

## 2023-12-05 ENCOUNTER — APPOINTMENT (OUTPATIENT)
Dept: RADIOLOGY | Facility: HOSPITAL | Age: 68
End: 2023-12-05
Payer: COMMERCIAL

## 2023-12-05 VITALS
RESPIRATION RATE: 16 BRPM | WEIGHT: 209 LBS | HEART RATE: 91 BPM | DIASTOLIC BLOOD PRESSURE: 92 MMHG | TEMPERATURE: 98.1 F | BODY MASS INDEX: 38.46 KG/M2 | HEIGHT: 62 IN | OXYGEN SATURATION: 94 % | SYSTOLIC BLOOD PRESSURE: 138 MMHG

## 2023-12-05 DIAGNOSIS — M25.531 WRIST PAIN, ACUTE, RIGHT: Primary | ICD-10-CM

## 2023-12-05 PROCEDURE — 73090 X-RAY EXAM OF FOREARM: CPT | Mod: RIGHT SIDE | Performed by: RADIOLOGY

## 2023-12-05 PROCEDURE — 73110 X-RAY EXAM OF WRIST: CPT | Mod: RT

## 2023-12-05 PROCEDURE — 99284 EMERGENCY DEPT VISIT MOD MDM: CPT | Mod: 25

## 2023-12-05 PROCEDURE — 99284 EMERGENCY DEPT VISIT MOD MDM: CPT | Performed by: PHYSICIAN ASSISTANT

## 2023-12-05 PROCEDURE — 73090 X-RAY EXAM OF FOREARM: CPT | Mod: RT,FR

## 2023-12-05 PROCEDURE — 73110 X-RAY EXAM OF WRIST: CPT | Mod: RIGHT SIDE | Performed by: RADIOLOGY

## 2023-12-05 RX ORDER — IBUPROFEN 600 MG/1
600 TABLET ORAL EVERY 6 HOURS PRN
Qty: 28 TABLET | Refills: 0 | Status: SHIPPED | OUTPATIENT
Start: 2023-12-05 | End: 2023-12-12

## 2023-12-05 RX ORDER — ACETAMINOPHEN 500 MG
1000 TABLET ORAL EVERY 8 HOURS PRN
Qty: 30 TABLET | Refills: 0 | Status: SHIPPED | OUTPATIENT
Start: 2023-12-05 | End: 2023-12-15

## 2023-12-05 ASSESSMENT — COLUMBIA-SUICIDE SEVERITY RATING SCALE - C-SSRS
2. HAVE YOU ACTUALLY HAD ANY THOUGHTS OF KILLING YOURSELF?: NO
6. HAVE YOU EVER DONE ANYTHING, STARTED TO DO ANYTHING, OR PREPARED TO DO ANYTHING TO END YOUR LIFE?: NO
1. IN THE PAST MONTH, HAVE YOU WISHED YOU WERE DEAD OR WISHED YOU COULD GO TO SLEEP AND NOT WAKE UP?: NO

## 2023-12-05 ASSESSMENT — PAIN SCALES - GENERAL: PAINLEVEL_OUTOF10: 5 - MODERATE PAIN

## 2023-12-05 ASSESSMENT — PAIN DESCRIPTION - LOCATION: LOCATION: WRIST

## 2023-12-05 ASSESSMENT — PAIN - FUNCTIONAL ASSESSMENT: PAIN_FUNCTIONAL_ASSESSMENT: 0-10

## 2023-12-06 NOTE — DISCHARGE INSTRUCTIONS
Xrays are normal.  Rest, Ice, Compress (with splint) and Elevate  Please call [phone] for Primary Care referral for follow up appointments.

## 2023-12-06 NOTE — ED PROVIDER NOTES
Emergency Department Encounter  [facility] EMERGENCY MEDICINE    Patient: Jeannine Moreno  MRN: [id number]  : 1955  Date of Evaluation: 2023  ED Provider: Beatriz Bagley PA-C      Chief Complaint       Chief Complaint   Patient presents with    Wrist Pain     HPI    Jeannine Moreno is a 68 y.o. female who presents to the emergency department presenting for atraumatic right wrist pain that started when she woke up this morning.  Patient has a history of bilateral legal blindness.  Endorses that she uses her hands and wrists frequently and repetitively secondary to her blindness.  Is left-hand dominant.  No falls or injuries.  Family at bedside gives supplemental history of present illness and states that her wrist has not appeared warm red or swollen.  PMH of diabetes, glucose was in the 160s this morning.  No associated numbness, tingling, weakness.    ROS:     Review of Systems  14 systems reviewed and otherwise acutely negative except as in the HPI.    Past History     Past Medical History:   Diagnosis Date    Diabetes (CMS/[facility])     Essential (primary) hypertension 2022    Hypertension with goal blood pressure less than 130/80     History reviewed. No pertinent surgical history.  Social History     Socioeconomic History    Marital status: Unknown     Spouse name: None    Number of children: None    Years of education: None    Highest education level: None   Occupational History    None   Tobacco Use    Smoking status: Never    Smokeless tobacco: Never   Substance and Sexual Activity    Alcohol use: Not Currently    Drug use: Never    Sexual activity: None   Other Topics Concern    None   Social History Narrative    None     Social Determinants of Health     Financial Resource Strain: Not on file   Food Insecurity: Not on file   Transportation Needs: Not on file   Physical Activity: Not on file   Stress: Not on file   Social Connections: Not on file   Intimate Partner  Violence: Not on file   Housing Stability: Not on file       Medications/Allergies     Previous Medications    ATORVASTATIN (LIPITOR) 40 MG TABLET    Take 1 tablet (40 mg) by mouth once daily at bedtime.    BLOOD PRESSURE MONITOR KIT        BLOOD SUGAR DIAGNOSTIC (EASYMAX 15 TEST STRIPS) STRIP    Check your blood sugar three times a day before meals.    CHOLECALCIFEROL (VITAMIN D-3) 50 MCG (2,000 UNIT) CAPSULE    Take 1 capsule (50 mcg) by mouth once daily.    DORZOLAMIDE (TRUSOPT) 2 % OPHTHALMIC SOLUTION    Administer into affected eye(s).    DORZOLAMIDE-TIMOLOL (COSOPT) 22.3-6.8 MG/ML OPHTHALMIC SOLUTION    1 drop 2 times a day.    ERYTHROMYCIN WITH ETHANOL (THERAMYCIN) 2 % EXTERNAL SOLUTION    Erythromycin 2 % External Solution   Quantity: 1  Refills: 0        Start : 6-Jan-2022   Active    GABAPENTIN (NEURONTIN) 100 MG CAPSULE    Take 1 capsule (100 mg) by mouth 3 times a day.    JANUMET 50-1,000 MG TABLET    Take 1 tablet by mouth 2 times a day before meals.    LANCETS MISC    Check glucose three times a day before meals    LATANOPROST (XALATAN) 0.005 % OPHTHALMIC SOLUTION    Instill 1 drop into left eye at bedtime    LIDOCAINE (LIDODERM) 5 % PATCH    see administration instructions. Apply as directed by physician    LISINOPRIL 5 MG TABLET    Take 1 tablet (5 mg) by mouth once daily.    MINERAL OIL-HYDROPHILIC PETROLATUM (AQUAPHOR) OINTMENT    Apply topically if needed for dry skin.    PREDNISOLONE ACETATE (PRED-FORTE) 1 % OPHTHALMIC SUSPENSION    Instill 1 drop into right eye as needed    WHITE PETROLATUM OINTMENT    APPLY SPARINGLY TO AFFECTED AREA AREAS 3 TIMES A DAY     Allergies   Allergen Reactions    Oxycodone-Acetaminophen Other        Physical Exam       ED Triage Vitals [12/05/23 2110]   Temp Heart Rate Resp BP   36.7 °C (98.1 °F) 91 16 (!) 138/92      SpO2 Temp Source Heart Rate Source Patient Position   94 % Temporal Monitor --      BP Location FiO2 (%)     -- --         Physical Exam    Physical  "Exam:    VS: As documented in the triage note and EMR flowsheet from this visit were reviewed.    Appearance: Alert, oriented, cooperative, in no acute distress. Well nourished & well hydrated.    Skin: Atraumatic. Warm, intact and dry.     Pulmonary: Clear bilaterally with good chest wall excursion. No rales, rhonchi or wheezing. No accessory muscle use or stridor.     Cardiac: Normal S1, S2     Musculoskeletal: Spontaneously moving all extremities. Extremities warm and well-perfused, capillary refill less than 2 seconds. Pulses full and equal. No snuffbox TTP. Increased pain with attempting to pronate and supinate independently. No RUE erythema, edema or increased warmth.    Neurological: Normal sensation, no weakness.    Diagnostics     Radiographs:  XR wrist right 3+ views   Final Result   No acute osseous findings.   Signed by Gordon Garcia II, MD      XR forearm right 2 views    (Results Pending)       ED Course   Visit Vitals  BP (!) 138/92   Pulse 91   Temp 36.7 °C (98.1 °F) (Temporal)   Resp 16   Ht 1.575 m (5' 2\")   Wt 94.8 kg (209 lb)   SpO2 94%   BMI 38.23 kg/m²   Smoking Status Never   BSA 2.04 m²     Medications - No data to display    Medical Decision Making   +NVI - Low c/f gout and septic arthritis given lack of erythema, edema and increased warmth.   Xrs negative for acute findings. Rx for motrin and tylenol as needed, provided with splint as needed for comfort.      Final Impression      1. Wrist pain, acute, right          DISPOSITION  Disposition: discharge  Patient condition is: Stable    Comment: Please note this report has been produced using speech recognition software and may contain errors related to that system including errors in grammar, punctuation, and spelling, as well as words and phrases that may be inappropriate.  If there are any questions or concerns please feel free to contact the dictating provider for clarification.    STEVEN Nova, " STEVEN  12/05/23 2246

## 2023-12-07 DIAGNOSIS — E55.9 MILD VITAMIN D DEFICIENCY: Primary | ICD-10-CM

## 2023-12-07 DIAGNOSIS — E11.69 TYPE 2 DIABETES MELLITUS WITH OTHER SPECIFIED COMPLICATION, WITHOUT LONG-TERM CURRENT USE OF INSULIN (MULTI): ICD-10-CM

## 2023-12-07 RX ORDER — SITAGLIPTIN AND METFORMIN HYDROCHLORIDE 1000; 50 MG/1; MG/1
1 TABLET, FILM COATED ORAL
Qty: 60 TABLET | Refills: 11 | Status: SHIPPED | OUTPATIENT
Start: 2023-12-07 | End: 2024-03-11 | Stop reason: SDUPTHER

## 2023-12-07 RX ORDER — LANCETS
EACH MISCELLANEOUS
Qty: 100 EACH | Refills: 3 | Status: SHIPPED | OUTPATIENT
Start: 2023-12-07

## 2023-12-07 RX ORDER — ACETAMINOPHEN 500 MG
2000 TABLET ORAL DAILY
Qty: 30 CAPSULE | Refills: 11 | Status: SHIPPED | OUTPATIENT
Start: 2023-12-07

## 2023-12-28 ENCOUNTER — TELEPHONE (OUTPATIENT)
Dept: INTERNAL MEDICINE | Facility: HOSPITAL | Age: 68
End: 2023-12-28
Payer: COMMERCIAL

## 2023-12-28 NOTE — TELEPHONE ENCOUNTER
Result Communication:     I called the patient regarding her increase in HbA1c to 9.2 from 8.3 on 12/3.  I spoke to the patient about how this increase in A1c is worrisome and we will need more glycemic control to ensure that she does not have any complications secondary to a hyperglycemic state.  The patient defers from adding another medication at this time.  I had discussed the possibility of adding Januvia to her medication regimen.  The patient states that when the HbA1c was obtained in 12/3 she did not have her Janumet medication for about 2 weeks.  The patient states that now she is being very compliant with her medications and taking it every day.  The patient would like to see 1 more level before considering adding in the medication.  I discussed with the patient regarding getting an A1c on 3/4 and make an appointment with me shortly after so we can go over the results and consider adjusting her medication regimen.

## 2024-01-16 ENCOUNTER — TELEPHONE (OUTPATIENT)
Dept: PRIMARY CARE | Facility: CLINIC | Age: 69
End: 2024-01-16
Payer: COMMERCIAL

## 2024-01-16 NOTE — TELEPHONE ENCOUNTER
Pt is requesting for strips for her glucose machine, please give the patient a call about this matter. She has some information about there insurance

## 2024-01-17 NOTE — TELEPHONE ENCOUNTER
I called pt back regarding her call. She stated that she has been trying to get a specific test strip for her glucose meter called the easy max strip for her easy max reader. She stated that she was able to find that a private company carried those strips. The company is called [employer] ( Ph #: [phone]). Called company who requested a fax number for prescription to be sent out. Called pt back to update her on the status. Counseled pt that she is not currently taking insulin so she does not need to test her glucose often. She can take her glucose every few days and that should be sufficient. Pt expressed understanding.

## 2024-03-08 ENCOUNTER — LAB (OUTPATIENT)
Dept: LAB | Facility: LAB | Age: 69
End: 2024-03-08
Payer: COMMERCIAL

## 2024-03-08 DIAGNOSIS — E11.69 TYPE 2 DIABETES MELLITUS WITH OTHER SPECIFIED COMPLICATION, WITHOUT LONG-TERM CURRENT USE OF INSULIN (MULTI): ICD-10-CM

## 2024-03-08 DIAGNOSIS — E11.69 TYPE 2 DIABETES MELLITUS WITH OTHER SPECIFIED COMPLICATION, WITHOUT LONG-TERM CURRENT USE OF INSULIN (MULTI): Primary | ICD-10-CM

## 2024-03-08 LAB
EST. AVERAGE GLUCOSE BLD GHB EST-MCNC: 223 MG/DL
HBA1C MFR BLD: 9.4 %

## 2024-03-08 PROCEDURE — 36415 COLL VENOUS BLD VENIPUNCTURE: CPT

## 2024-03-08 PROCEDURE — 83036 HEMOGLOBIN GLYCOSYLATED A1C: CPT

## 2024-03-11 ENCOUNTER — OFFICE VISIT (OUTPATIENT)
Dept: PRIMARY CARE | Facility: CLINIC | Age: 69
End: 2024-03-11
Payer: COMMERCIAL

## 2024-03-11 VITALS
HEART RATE: 74 BPM | DIASTOLIC BLOOD PRESSURE: 83 MMHG | WEIGHT: 206 LBS | SYSTOLIC BLOOD PRESSURE: 143 MMHG | OXYGEN SATURATION: 95 % | TEMPERATURE: 97.3 F | HEIGHT: 62 IN | BODY MASS INDEX: 37.91 KG/M2

## 2024-03-11 DIAGNOSIS — H54.3 BLINDNESS OF BOTH EYES: Primary | ICD-10-CM

## 2024-03-11 DIAGNOSIS — I10 HYPERTENSION, ESSENTIAL: Primary | ICD-10-CM

## 2024-03-11 DIAGNOSIS — E11.69 TYPE 2 DIABETES MELLITUS WITH OTHER SPECIFIED COMPLICATION, WITHOUT LONG-TERM CURRENT USE OF INSULIN (MULTI): ICD-10-CM

## 2024-03-11 DIAGNOSIS — H54.3 BLINDNESS OF BOTH EYES: ICD-10-CM

## 2024-03-11 DIAGNOSIS — I10 HYPERTENSION, ESSENTIAL: ICD-10-CM

## 2024-03-11 PROCEDURE — 1159F MED LIST DOCD IN RCRD: CPT

## 2024-03-11 PROCEDURE — 1126F AMNT PAIN NOTED NONE PRSNT: CPT

## 2024-03-11 PROCEDURE — 3046F HEMOGLOBIN A1C LEVEL >9.0%: CPT

## 2024-03-11 PROCEDURE — 99213 OFFICE O/P EST LOW 20 MIN: CPT

## 2024-03-11 PROCEDURE — 1036F TOBACCO NON-USER: CPT

## 2024-03-11 PROCEDURE — 3079F DIAST BP 80-89 MM HG: CPT

## 2024-03-11 PROCEDURE — 3077F SYST BP >= 140 MM HG: CPT

## 2024-03-11 PROCEDURE — 4010F ACE/ARB THERAPY RXD/TAKEN: CPT

## 2024-03-11 RX ORDER — DICLOFENAC SODIUM 75 MG/1
TABLET, DELAYED RELEASE ORAL
COMMUNITY
Start: 2020-11-10

## 2024-03-11 RX ORDER — SITAGLIPTIN AND METFORMIN HYDROCHLORIDE 1000; 50 MG/1; MG/1
1 TABLET, FILM COATED ORAL
Qty: 180 TABLET | Refills: 3 | Status: SHIPPED | OUTPATIENT
Start: 2024-03-11 | End: 2025-03-11

## 2024-03-11 RX ORDER — LISINOPRIL 5 MG/1
5 TABLET ORAL DAILY
Qty: 90 TABLET | Refills: 3 | Status: SHIPPED | OUTPATIENT
Start: 2024-03-11 | End: 2025-03-11

## 2024-03-11 RX ORDER — ACETAMINOPHEN 500 MG
TABLET ORAL
Qty: 1 EACH | Refills: 0 | Status: SHIPPED | OUTPATIENT
Start: 2024-03-11 | End: 2024-05-02 | Stop reason: SDUPTHER

## 2024-03-11 ASSESSMENT — PAIN SCALES - GENERAL: PAINLEVEL: 0-NO PAIN

## 2024-03-11 ASSESSMENT — PATIENT HEALTH QUESTIONNAIRE - PHQ9
1. LITTLE INTEREST OR PLEASURE IN DOING THINGS: NOT AT ALL
2. FEELING DOWN, DEPRESSED OR HOPELESS: NOT AT ALL
SUM OF ALL RESPONSES TO PHQ9 QUESTIONS 1 AND 2: 0

## 2024-03-11 ASSESSMENT — ENCOUNTER SYMPTOMS
LOSS OF SENSATION IN FEET: 0
DEPRESSION: 0
OCCASIONAL FEELINGS OF UNSTEADINESS: 0

## 2024-03-11 NOTE — PROGRESS NOTES
I reviewed the resident/fellow's documentation and discussed the patient with the resident/fellow. I agree with the resident/fellow's medical decision making as documented in the note.   VS noted.  BP above goal 2/2 lack of medication. Medications refilled.  Katelin Banks MD

## 2024-03-11 NOTE — PROGRESS NOTES
"Subjective   Patient ID: Jeannine Moreno is a 68 y.o. female who presents for Annual Exam (' I want to check my A1C today').    HPI     Review of Systems    Objective   /83 (BP Location: Left arm, Patient Position: Sitting)   Pulse 74   Temp 36.3 °C (97.3 °F)   Ht 1.575 m (5' 2\")   Wt 93.4 kg (206 lb)   SpO2 95%   BMI 37.68 kg/m²     Physical Exam    Assessment/Plan          "

## 2024-03-11 NOTE — PROGRESS NOTES
"Subjective   Patient ID: Jeannine Moreno is a 68 y.o. female PMHx of controlled T2DM, HTN, Vit D deficiency, shingles with postherpetic neuralgia, bilateral legal blindness and glaucoma of L eye who presents for Annual Exam (' I want to check my A1C today').    HPI     Pt had HMV on 12/4/23 with Dr Rossi.     Acute concern: None (wants to review DM)     Diabetes  Lab Results   Component Value Date    HGBA1C 9.4 (H) 03/08/2024     No results found for: \"LDLCALC\"  The 10-year ASCVD risk score (Perez SANDERS, et al., 2019) is: 27%    Values used to calculate the score:      Age: 68 years      Sex: Female      Is Non- : Yes      Diabetic: Yes      Tobacco smoker: No      Systolic Blood Pressure: 143 mmHg      Is BP treated: Yes      HDL Cholesterol: 48.1 mg/dL      Total Cholesterol: 166 mg/dL  Lab Results   Component Value Date    CREATININE 0.60 08/17/2023     - medications: janumet 50-1000mg BID (pt states she has not been taking it for over a month, ran out of prescription, Dr Rossi has sent refill in December but pt never got it.   - missing any doses: yes as above  - takes BS?: yes  - highest sugar: 250   - any hypoglycemia (sx or <70): no  - on statin: yes  - on ACEi/ARB: lisinopril 5mg for HTN     #HTN  - /83  - on lisinopril 5mg daily (ran out of prescription)   - does not check BP at home   - needs special BP kit for blind   - denies CP/SOB/HA/dizziness    Review of Systems  12pt ROS negative except as mentioned in HPI     Objective   /83 (BP Location: Left arm, Patient Position: Sitting)   Pulse 74   Temp 36.3 °C (97.3 °F)   Ht 1.575 m (5' 2\")   Wt 93.4 kg (206 lb)   SpO2 95%   BMI 37.68 kg/m²     Physical Exam  Constitutional:       Appearance: She is obese.   Eyes:      Comments: Legally blind in both eyes   Cardiovascular:      Rate and Rhythm: Normal rate and regular rhythm.      Pulses: Normal pulses.      Heart sounds: Normal heart sounds.   Pulmonary:      " Effort: Pulmonary effort is normal.      Breath sounds: Normal breath sounds.   Abdominal:      General: Abdomen is flat. Bowel sounds are normal.      Palpations: Abdomen is soft.   Musculoskeletal:      Comments: Mild pedal edema b/l   Skin:     General: Skin is warm.      Capillary Refill: Capillary refill takes less than 2 seconds.   Neurological:      Mental Status: She is alert and oriented to person, place, and time.       Assessment/Plan      68 y.o. female PMHx of controlled T2DM, HTN, Vit D deficiency, shingles with postherpetic neuralgia, bilateral legal blindness and glaucoma of L eye who presents for follow up. HD stable. We addressed her diabetes and HTN today. Plan as below.     #DM  - recent A1c 9.4, poorly controlled due to med non-compliance   - medication refill sent to her pharmacy   - healthy diet and exercise discussed   - will repeat A1c in 3 months   - recommended regular foot exam     #HTN  - not at goal due to med non-compliance  - Currently asx   - Lisinopril 5mg rx sent to her pharmacy   - talking BP kit ordered    #Social need  - Pt currently takes care of her personal need  - Offered home health aid/support, pt declined  - Goes to [facility] [phone]    RTC 1 month for medication compliance.     Discussed with Dr Banks      Problem List Items Addressed This Visit             ICD-10-CM    DM2 (diabetes mellitus, type 2) (CMS/[facility]) E11.9    Relevant Medications    Janumet 50-1,000 mg tablet    Hypertension, essential - Primary I10    Relevant Medications    lisinopril 5 mg tablet              Zach Olivares MD  Family Medicine PGY2     Addendum (3/11/24, 11:40am)  Called pt's pharmacy ([facility] [place])   - Per their record, pt is currently on glipizide 5mg ER in the morning, janumet  BID, lisinopril 5mg in the morning , vit d 50 mcg in the morning.   - Last medication delivery was 3 days ago  - medications are arranged in pill pack for AM and Night   - Updated  medication list over the phone - requested to remove glipizide and add lipitor 40mg based on our medication list  - Pharmacy does not cover BP monitor kit, therefore, DME supply ordered for talking BP kit   - Easymax test strip also not available at [facility] pharmacy, rx needs to be sent to [facility] if needed.     Called pt, pt denies receiving any medication delivery. Advised to check and  come back to the clinic with her pill box for nurse visit to sort out her medication if she received it (We need to remove glipizide from her AM pack) and arrange it again in a pill box. Pt can come on Thursday. Patient will call our office if she didn't receive her meds and will have to follow up with her pharmacy.     Zach Olivares MD  Family Medicine PGY2

## 2024-03-14 DIAGNOSIS — E78.5 HYPERLIPIDEMIA, UNSPECIFIED HYPERLIPIDEMIA TYPE: Primary | ICD-10-CM

## 2024-03-14 RX ORDER — ATORVASTATIN CALCIUM 40 MG/1
40 TABLET, FILM COATED ORAL NIGHTLY
Qty: 90 TABLET | Refills: 3 | Status: SHIPPED | OUTPATIENT
Start: 2024-03-14 | End: 2025-03-14

## 2024-03-14 NOTE — PROGRESS NOTES
Patient came today for a nurse visit to review her medications that were recently mailed from her mail order pharmacy.  Patient is blind and is unable to distinguish her medications easily.  On reviewing her medications with clinic RN, determined that the patient was still sent her glipizide which was discontinued with the pharmacy notified.  In addition, determined that the patient's atorvastatin was not in the pill pack, and the patient did not have any active prescription.   Purchased the patient a daily pill huerta, and will remove the glipizide for her morning medication.  As well as calling the pharmacy to report that the patient should not be continued on this medication.  Will also prescribe the patient atorvastatin to be filled by pharmacy.    Plan:  - place patient morning medication in pill box (enough for 1 week)  - contact mail order pharmacy and discontinue the patient glipizide  - restart patient atorvastatin 40mg daily in evening   - plan for a nurse visit in one week (3/21/24) to review medication again if the patient has not received new medication packs from the pharmacy (if not can refill pill box for morning medication for the patient)    JEROME Rachel MD MS  PGY-2 Family Medicine

## 2024-03-20 ENCOUNTER — APPOINTMENT (OUTPATIENT)
Dept: PRIMARY CARE | Facility: CLINIC | Age: 69
End: 2024-03-20
Payer: COMMERCIAL

## 2024-03-28 ENCOUNTER — CLINICAL SUPPORT (OUTPATIENT)
Dept: PRIMARY CARE | Facility: CLINIC | Age: 69
End: 2024-03-28
Payer: COMMERCIAL

## 2024-03-28 NOTE — PROGRESS NOTES
Patient here for help sorting her medications out into the weekly pill organizer. Patient is currently waiting for her new medication packs to come, in the meantime she needs help taking out the discontinued medication due to her blindness. Patient now has medication set up for the next 7 days.

## 2024-04-16 ENCOUNTER — OFFICE VISIT (OUTPATIENT)
Dept: PRIMARY CARE | Facility: CLINIC | Age: 69
End: 2024-04-16
Payer: COMMERCIAL

## 2024-04-16 VITALS
WEIGHT: 200.2 LBS | HEART RATE: 97 BPM | OXYGEN SATURATION: 96 % | RESPIRATION RATE: 16 BRPM | BODY MASS INDEX: 36.62 KG/M2 | DIASTOLIC BLOOD PRESSURE: 74 MMHG | TEMPERATURE: 96.8 F | SYSTOLIC BLOOD PRESSURE: 110 MMHG

## 2024-04-16 DIAGNOSIS — E11.69 TYPE 2 DIABETES MELLITUS WITH OTHER SPECIFIED COMPLICATION, WITHOUT LONG-TERM CURRENT USE OF INSULIN (MULTI): Primary | ICD-10-CM

## 2024-04-16 DIAGNOSIS — Z23 IMMUNIZATION DUE: ICD-10-CM

## 2024-04-16 DIAGNOSIS — I10 HYPERTENSION, ESSENTIAL: ICD-10-CM

## 2024-04-16 PROCEDURE — 1159F MED LIST DOCD IN RCRD: CPT

## 2024-04-16 PROCEDURE — 3078F DIAST BP <80 MM HG: CPT

## 2024-04-16 PROCEDURE — 4010F ACE/ARB THERAPY RXD/TAKEN: CPT

## 2024-04-16 PROCEDURE — 3074F SYST BP LT 130 MM HG: CPT

## 2024-04-16 PROCEDURE — 90471 IMMUNIZATION ADMIN: CPT

## 2024-04-16 PROCEDURE — 99214 OFFICE O/P EST MOD 30 MIN: CPT

## 2024-04-16 PROCEDURE — 1126F AMNT PAIN NOTED NONE PRSNT: CPT

## 2024-04-16 PROCEDURE — 90677 PCV20 VACCINE IM: CPT

## 2024-04-16 PROCEDURE — 3046F HEMOGLOBIN A1C LEVEL >9.0%: CPT

## 2024-04-16 ASSESSMENT — PATIENT HEALTH QUESTIONNAIRE - PHQ9
2. FEELING DOWN, DEPRESSED OR HOPELESS: NOT AT ALL
1. LITTLE INTEREST OR PLEASURE IN DOING THINGS: NOT AT ALL
SUM OF ALL RESPONSES TO PHQ9 QUESTIONS 1 AND 2: 0

## 2024-04-16 ASSESSMENT — ENCOUNTER SYMPTOMS
DEPRESSION: 0
LOSS OF SENSATION IN FEET: 0
OCCASIONAL FEELINGS OF UNSTEADINESS: 0

## 2024-04-16 ASSESSMENT — PAIN SCALES - GENERAL: PAINLEVEL: 0-NO PAIN

## 2024-04-16 NOTE — PROGRESS NOTES
Subjective   Patient ID: Jeannine Moreno is a 68 y.o. female who presents for Follow-up.    HPI   # DMII   - hbA1c of 9.3 on 3/5/24   - current medications:     Janumet  2 times a day before  meals   - sees foot doctor once a month. Goes every 90 days. Next appointment in June.  - meatloaf mashed potatoes and corn for dinner. Will microwave foods other times.  Twice a week will have soda.   - slim fast shakes and boost 2 times a day. ,Will eat meal for 1 meal/ day.  - states that she rarely cooks. She buys ready made foods from the grocery store or buys microwave meals.   - some neuropathy in right foot.     # HTN  - IO BP of 110/74  - med regimen: lisinopril 5 mg.    #shoulder pain   - R shoulder surgery in past.  - tylenol pill once in a while with diclofenac gel. With some relief.   - came to  for physical therapy  in past.   - not interested in doing physical therapy now.     # HM   - colonoscopy in 2016   - pneumovax due    Review of Systems  12 point ROS negative except as stated in HPI.     Objective   /74   Pulse 97   Temp 36 °C (96.8 °F)   Resp 16   Wt 90.8 kg (200 lb 3.2 oz)   SpO2 96%   BMI 36.62 kg/m²     Physical Exam  Gen: NAD, nontox  HEENT: NCAT.  MMM.  RESP: no resp distress, talks in full sentences, CTABL  CVS: non-tachycardic, RRR  ABD: Soft, non-distended  Psych: appropriately answers questions. normal mood and affect  MSK: appears to have Full range of motion on all extremities.    Assessment/Plan     DAVE Moreno is a  67 yo F w/ total blindness, HTN and DMII who presents to the clinic for follow-up on DM. The pt does not have adequate glycemic control currently due to not being able to take her medications. Due  to patient's disability she was not able to organize her medications and she had come in to the clinic on 3/29/24 for a nurse visit in order to have her medications put in pill box by clinic nurse. She states that since then she has been taking her of her  medications appropriately and defers form home aid or other assistance as she says that she is mailed her medications in packs and she is able to take them correctly going forward. The pt is amenable to adding another medication today for better glycemic control. The pt also brings in an audible BP monitor for assistance in operating. Pt was assisted in fastening the cuff on her arm and was supervised to ensure that she would be able to operate the BP cuff independently.     #DMII  :: HbA1c of 9.4 (3/8/24) from 9.0.   :: current meds: janumet  BID   :: pt states that she was not taking medication consistently due to disorganization of medications. Pt has total blindness.   - Start rybelsus 3 mg.   - c/w lisinopril 5 mg for renal protection    # HTN  :: IO /74   :: at goal   - c/w lisinopril 5 mg    # shoulder pain   :: Pt defers from PT at this time.  - c/w diclofenac gel and tylenol.    RTC in 2 months for hba1c and close DMII follow-up given poor glycemic control    Seen and discussed with Dr. Zackery Rossi MD, MPH   Family Medicine and Preventive Health, PGY-2

## 2024-04-21 NOTE — PROGRESS NOTES
I saw and evaluated the patient. I personally obtained the key and critical portions of the history and physical exam or was physically present for key and critical portions performed by the resident. I reviewed the resident's documentation and discussed the patient with the resident. I agree with the resident's medical decision making as documented in the note.    Bola Vizcarra MD

## 2024-04-22 ENCOUNTER — OFFICE VISIT (OUTPATIENT)
Dept: DERMATOLOGY | Facility: HOSPITAL | Age: 69
End: 2024-04-22
Payer: COMMERCIAL

## 2024-04-22 DIAGNOSIS — L82.1 SEBORRHEIC KERATOSIS: Primary | ICD-10-CM

## 2024-04-22 PROCEDURE — 99213 OFFICE O/P EST LOW 20 MIN: CPT | Performed by: DERMATOLOGY

## 2024-04-22 PROCEDURE — 4010F ACE/ARB THERAPY RXD/TAKEN: CPT | Performed by: DERMATOLOGY

## 2024-04-22 PROCEDURE — 1036F TOBACCO NON-USER: CPT | Performed by: DERMATOLOGY

## 2024-04-22 PROCEDURE — 1160F RVW MEDS BY RX/DR IN RCRD: CPT | Performed by: DERMATOLOGY

## 2024-04-22 PROCEDURE — 1159F MED LIST DOCD IN RCRD: CPT | Performed by: DERMATOLOGY

## 2024-04-22 PROCEDURE — 3046F HEMOGLOBIN A1C LEVEL >9.0%: CPT | Performed by: DERMATOLOGY

## 2024-04-22 NOTE — PROGRESS NOTES
Subjective     Jeannine Moreno is a 68 y.o. female who presents for the following: Suspicious Skin Lesion (On left breast for many years - not itching or bleeding ).     Last derm visit 2/2023 with Dr. Salvador for traction alopecia, reassurance      Review of Systems:  No other skin or systemic complaints other than what is documented elsewhere in the note.    The following portions of the chart were reviewed this encounter and updated as appropriate:   Tobacco  Allergies  Meds  Problems  Med Hx  Surg Hx         Skin Cancer History  No skin cancer on file.      Specialty Problems          Dermatology Problems    Burn injury    Skin lesion of breast        Objective   Well appearing patient in no apparent distress; mood and affect are within normal limits.    A focused skin examination was performed. All findings within normal limits unless otherwise noted below.    Assessment/Plan   1. Seborrheic keratosis (3)  Left Breast, Left Upper Back, Right Lower Back  Stuck on, waxy macule(s)/papule(s)/plaque(s) with comedo-like openings and milia like cysts    -Discussed the nature of the diagnosis  -Reassurance, recommend continued observation        Follow up as needed

## 2024-05-02 DIAGNOSIS — H54.3 BLINDNESS OF BOTH EYES: ICD-10-CM

## 2024-05-02 DIAGNOSIS — I10 HYPERTENSION, ESSENTIAL: ICD-10-CM

## 2024-05-02 RX ORDER — ACETAMINOPHEN 500 MG
TABLET ORAL
Qty: 1 EACH | Refills: 0 | Status: SHIPPED | OUTPATIENT
Start: 2024-05-02

## 2024-05-02 RX ORDER — ACETAMINOPHEN 500 MG
TABLET ORAL
Qty: 1 EACH | Refills: 0 | Status: SHIPPED | OUTPATIENT
Start: 2024-05-02 | End: 2024-05-02 | Stop reason: SDUPTHER

## 2024-08-12 ENCOUNTER — TELEPHONE (OUTPATIENT)
Dept: PRIMARY CARE | Facility: CLINIC | Age: 69
End: 2024-08-12

## 2024-09-24 ENCOUNTER — APPOINTMENT (OUTPATIENT)
Dept: PRIMARY CARE | Facility: CLINIC | Age: 69
End: 2024-09-24
Payer: COMMERCIAL

## 2024-10-31 ENCOUNTER — HOSPITAL ENCOUNTER (EMERGENCY)
Facility: HOSPITAL | Age: 69
Discharge: HOME | End: 2024-10-31
Attending: EMERGENCY MEDICINE
Payer: COMMERCIAL

## 2024-10-31 ENCOUNTER — APPOINTMENT (OUTPATIENT)
Dept: RADIOLOGY | Facility: HOSPITAL | Age: 69
End: 2024-10-31
Payer: COMMERCIAL

## 2024-10-31 VITALS
OXYGEN SATURATION: 95 % | RESPIRATION RATE: 15 BRPM | DIASTOLIC BLOOD PRESSURE: 82 MMHG | SYSTOLIC BLOOD PRESSURE: 147 MMHG | TEMPERATURE: 98.1 F | HEART RATE: 85 BPM

## 2024-10-31 DIAGNOSIS — R51.9 NONINTRACTABLE HEADACHE, UNSPECIFIED CHRONICITY PATTERN, UNSPECIFIED HEADACHE TYPE: Primary | ICD-10-CM

## 2024-10-31 LAB
ALBUMIN SERPL BCP-MCNC: 3.7 G/DL (ref 3.4–5)
ALP SERPL-CCNC: 105 U/L (ref 33–136)
ALT SERPL W P-5'-P-CCNC: 13 U/L (ref 7–45)
ANION GAP SERPL CALC-SCNC: 16 MMOL/L (ref 10–20)
AST SERPL W P-5'-P-CCNC: 11 U/L (ref 9–39)
BASOPHILS # BLD AUTO: 0.02 X10*3/UL (ref 0–0.1)
BASOPHILS NFR BLD AUTO: 0.4 %
BILIRUB SERPL-MCNC: 0.3 MG/DL (ref 0–1.2)
BUN SERPL-MCNC: 15 MG/DL (ref 6–23)
CALCIUM SERPL-MCNC: 8.6 MG/DL (ref 8.6–10.6)
CHLORIDE SERPL-SCNC: 105 MMOL/L (ref 98–107)
CO2 SERPL-SCNC: 23 MMOL/L (ref 21–32)
CREAT SERPL-MCNC: 0.74 MG/DL (ref 0.5–1.05)
EGFRCR SERPLBLD CKD-EPI 2021: 87 ML/MIN/1.73M*2
EOSINOPHIL # BLD AUTO: 0.09 X10*3/UL (ref 0–0.4)
EOSINOPHIL NFR BLD AUTO: 1.6 %
ERYTHROCYTE [DISTWIDTH] IN BLOOD BY AUTOMATED COUNT: 13.4 % (ref 11.5–14.5)
EST. AVERAGE GLUCOSE BLD GHB EST-MCNC: 280 MG/DL
FLUAV RNA RESP QL NAA+PROBE: NOT DETECTED
FLUBV RNA RESP QL NAA+PROBE: NOT DETECTED
GLUCOSE BLD MANUAL STRIP-MCNC: 268 MG/DL (ref 74–99)
GLUCOSE BLD MANUAL STRIP-MCNC: 333 MG/DL (ref 74–99)
GLUCOSE SERPL-MCNC: 332 MG/DL (ref 74–99)
HBA1C MFR BLD: 11.4 %
HCT VFR BLD AUTO: 37.8 % (ref 36–46)
HGB BLD-MCNC: 13.1 G/DL (ref 12–16)
IMM GRANULOCYTES # BLD AUTO: 0.01 X10*3/UL (ref 0–0.5)
IMM GRANULOCYTES NFR BLD AUTO: 0.2 % (ref 0–0.9)
LYMPHOCYTES # BLD AUTO: 2.24 X10*3/UL (ref 0.8–3)
LYMPHOCYTES NFR BLD AUTO: 40.9 %
MCH RBC QN AUTO: 29.2 PG (ref 26–34)
MCHC RBC AUTO-ENTMCNC: 34.7 G/DL (ref 32–36)
MCV RBC AUTO: 84 FL (ref 80–100)
MONOCYTES # BLD AUTO: 0.43 X10*3/UL (ref 0.05–0.8)
MONOCYTES NFR BLD AUTO: 7.8 %
NEUTROPHILS # BLD AUTO: 2.69 X10*3/UL (ref 1.6–5.5)
NEUTROPHILS NFR BLD AUTO: 49.1 %
NRBC BLD-RTO: 0 /100 WBCS (ref 0–0)
PLATELET # BLD AUTO: 279 X10*3/UL (ref 150–450)
POTASSIUM SERPL-SCNC: 4.1 MMOL/L (ref 3.5–5.3)
PROT SERPL-MCNC: 6.6 G/DL (ref 6.4–8.2)
RBC # BLD AUTO: 4.49 X10*6/UL (ref 4–5.2)
SARS-COV-2 RNA RESP QL NAA+PROBE: NOT DETECTED
SODIUM SERPL-SCNC: 140 MMOL/L (ref 136–145)
WBC # BLD AUTO: 5.5 X10*3/UL (ref 4.4–11.3)

## 2024-10-31 PROCEDURE — 96375 TX/PRO/DX INJ NEW DRUG ADDON: CPT

## 2024-10-31 PROCEDURE — 2500000001 HC RX 250 WO HCPCS SELF ADMINISTERED DRUGS (ALT 637 FOR MEDICARE OP): Mod: SE | Performed by: EMERGENCY MEDICINE

## 2024-10-31 PROCEDURE — 83036 HEMOGLOBIN GLYCOSYLATED A1C: CPT

## 2024-10-31 PROCEDURE — 70450 CT HEAD/BRAIN W/O DYE: CPT

## 2024-10-31 PROCEDURE — 87636 SARSCOV2 & INF A&B AMP PRB: CPT

## 2024-10-31 PROCEDURE — 99284 EMERGENCY DEPT VISIT MOD MDM: CPT | Performed by: EMERGENCY MEDICINE

## 2024-10-31 PROCEDURE — 2500000001 HC RX 250 WO HCPCS SELF ADMINISTERED DRUGS (ALT 637 FOR MEDICARE OP): Mod: SE

## 2024-10-31 PROCEDURE — 2500000004 HC RX 250 GENERAL PHARMACY W/ HCPCS (ALT 636 FOR OP/ED): Mod: SE

## 2024-10-31 PROCEDURE — 82947 ASSAY GLUCOSE BLOOD QUANT: CPT

## 2024-10-31 PROCEDURE — 96374 THER/PROPH/DIAG INJ IV PUSH: CPT

## 2024-10-31 PROCEDURE — 85025 COMPLETE CBC W/AUTO DIFF WBC: CPT

## 2024-10-31 PROCEDURE — 36415 COLL VENOUS BLD VENIPUNCTURE: CPT

## 2024-10-31 PROCEDURE — 2500000002 HC RX 250 W HCPCS SELF ADMINISTERED DRUGS (ALT 637 FOR MEDICARE OP, ALT 636 FOR OP/ED): Mod: SE

## 2024-10-31 PROCEDURE — 70450 CT HEAD/BRAIN W/O DYE: CPT | Performed by: RADIOLOGY

## 2024-10-31 PROCEDURE — 80053 COMPREHEN METABOLIC PANEL: CPT

## 2024-10-31 PROCEDURE — 99284 EMERGENCY DEPT VISIT MOD MDM: CPT | Mod: 25

## 2024-10-31 RX ORDER — METOCLOPRAMIDE HYDROCHLORIDE 5 MG/ML
10 INJECTION INTRAMUSCULAR; INTRAVENOUS ONCE
Status: COMPLETED | OUTPATIENT
Start: 2024-10-31 | End: 2024-10-31

## 2024-10-31 RX ORDER — INSULIN LISPRO 100 [IU]/ML
5 INJECTION, SOLUTION INTRAVENOUS; SUBCUTANEOUS ONCE
Status: COMPLETED | OUTPATIENT
Start: 2024-10-31 | End: 2024-10-31

## 2024-10-31 RX ORDER — ACETAMINOPHEN 325 MG/1
325 TABLET ORAL ONCE
Status: COMPLETED | OUTPATIENT
Start: 2024-10-31 | End: 2024-10-31

## 2024-10-31 RX ORDER — KETOROLAC TROMETHAMINE 15 MG/ML
15 INJECTION, SOLUTION INTRAMUSCULAR; INTRAVENOUS ONCE
Status: COMPLETED | OUTPATIENT
Start: 2024-10-31 | End: 2024-10-31

## 2024-10-31 RX ORDER — KETOROLAC TROMETHAMINE 15 MG/ML
INJECTION, SOLUTION INTRAMUSCULAR; INTRAVENOUS
Status: COMPLETED
Start: 2024-10-31 | End: 2024-10-31

## 2024-10-31 SDOH — HEALTH STABILITY: MENTAL HEALTH: BEHAVIORAL HEALTH(WDL): WITHIN DEFINED LIMITS

## 2024-10-31 SDOH — HEALTH STABILITY: MENTAL HEALTH: HAVE YOU ACTUALLY HAD ANY THOUGHTS OF KILLING YOURSELF?: NO

## 2024-10-31 SDOH — HEALTH STABILITY: MENTAL HEALTH: HAVE YOU EVER DONE ANYTHING, STARTED TO DO ANYTHING, OR PREPARED TO DO ANYTHING TO END YOUR LIFE?: NO

## 2024-10-31 SDOH — HEALTH STABILITY: MENTAL HEALTH: SUICIDE ASSESSMENT: ADULT (C-SSRS)

## 2024-10-31 SDOH — HEALTH STABILITY: MENTAL HEALTH: HAVE YOU WISHED YOU WERE DEAD OR WISHED YOU COULD GO TO SLEEP AND NOT WAKE UP?: NO

## 2024-10-31 ASSESSMENT — COLUMBIA-SUICIDE SEVERITY RATING SCALE - C-SSRS
6. HAVE YOU EVER DONE ANYTHING, STARTED TO DO ANYTHING, OR PREPARED TO DO ANYTHING TO END YOUR LIFE?: NO
2. HAVE YOU ACTUALLY HAD ANY THOUGHTS OF KILLING YOURSELF?: NO
1. IN THE PAST MONTH, HAVE YOU WISHED YOU WERE DEAD OR WISHED YOU COULD GO TO SLEEP AND NOT WAKE UP?: NO

## 2024-10-31 ASSESSMENT — PAIN DESCRIPTION - PROGRESSION: CLINICAL_PROGRESSION: NOT CHANGED

## 2024-10-31 ASSESSMENT — PAIN DESCRIPTION - FREQUENCY: FREQUENCY: INTERMITTENT

## 2024-10-31 ASSESSMENT — PAIN DESCRIPTION - DESCRIPTORS: DESCRIPTORS: THROBBING

## 2024-10-31 ASSESSMENT — PAIN DESCRIPTION - ORIENTATION: ORIENTATION: RIGHT

## 2024-10-31 ASSESSMENT — PAIN DESCRIPTION - PAIN TYPE: TYPE: ACUTE PAIN

## 2024-10-31 ASSESSMENT — PAIN - FUNCTIONAL ASSESSMENT: PAIN_FUNCTIONAL_ASSESSMENT: 0-10

## 2024-10-31 ASSESSMENT — PAIN SCALES - GENERAL
PAINLEVEL_OUTOF10: 5 - MODERATE PAIN
PAINLEVEL_OUTOF10: 6

## 2024-10-31 ASSESSMENT — PAIN DESCRIPTION - LOCATION: LOCATION: HEAD

## 2024-11-07 ENCOUNTER — OFFICE VISIT (OUTPATIENT)
Dept: PRIMARY CARE | Facility: CLINIC | Age: 69
End: 2024-11-07
Payer: COMMERCIAL

## 2024-11-07 ENCOUNTER — PHARMACY VISIT (OUTPATIENT)
Dept: PHARMACY | Facility: CLINIC | Age: 69
End: 2024-11-07
Payer: MEDICAID

## 2024-11-07 VITALS
DIASTOLIC BLOOD PRESSURE: 84 MMHG | HEIGHT: 62 IN | HEART RATE: 81 BPM | BODY MASS INDEX: 36.62 KG/M2 | WEIGHT: 199 LBS | TEMPERATURE: 97 F | SYSTOLIC BLOOD PRESSURE: 148 MMHG | OXYGEN SATURATION: 94 %

## 2024-11-07 DIAGNOSIS — H92.01 ACUTE EAR PAIN, RIGHT: Primary | ICD-10-CM

## 2024-11-07 PROCEDURE — RXMED WILLOW AMBULATORY MEDICATION CHARGE

## 2024-11-07 RX ORDER — CIPROFLOXACIN AND DEXAMETHASONE 3; 1 MG/ML; MG/ML
4 SUSPENSION/ DROPS AURICULAR (OTIC) 2 TIMES DAILY
Qty: 7.5 ML | Refills: 0 | Status: SHIPPED | OUTPATIENT
Start: 2024-11-07 | End: 2024-11-25

## 2024-11-07 RX ORDER — GLIPIZIDE 5 MG/1
TABLET, FILM COATED, EXTENDED RELEASE ORAL
COMMUNITY
Start: 2024-03-06

## 2024-11-07 ASSESSMENT — PAIN SCALES - GENERAL: PAINLEVEL_OUTOF10: 8

## 2024-11-07 ASSESSMENT — ENCOUNTER SYMPTOMS: HEADACHES: 1

## 2024-11-07 NOTE — PROGRESS NOTES
"Subjective   Patient ID: Jeannine Moreno is a 69 y.o. female who presents for Follow-up (Pt is here for a follow up from the ER. Pt is still having pain in her right ear. ) and Headache.    Jeannine Moreno is a 69 y.o. female with PMHX of T2DM, HTN, Vit D deficiency, shingles with postherpetic neuralgia, bilateral legal blindness who presents for follow-up after an ED visit for complaints of headache and hyperglycemia on 10/31/24.    In the Ed they ordered CT head, CBC with dif, CMP, hA1C, flu, covid. Ordered tylenol 325 mg, reglan 10 mg for HA. Ordered 5 units of insulin lispro for hyperglycemia. Work up was grossly negative and patient was discharged home with return precautions.     - A1C  notable at 11.4%, glucose 332. Patient has not been compliant with her medications. States she does not think she has been taking her Janumet or Semaglutide.     Patient states she takes Tylenol 500 mg once a day, but does not take it every day. Patient has not tried NSAIDs. Patient states the tylenol and Reglan cocktail she received in the   ED worked wonderfully. States the headache is on the back right side of her head. States the inside of her right ear has now been bothering her for 1-2 days. Describes it as a dull pain, no changes in her hearing, no drainage, no tenderness to touch.     Headache          Review of Systems   Neurological:  Positive for headaches.       Objective   /84 (BP Location: Left arm, Patient Position: Sitting)   Pulse 81   Temp 36.1 °C (97 °F) (Temporal)   Ht 1.575 m (5' 2\")   Wt 90.3 kg (199 lb)   SpO2 94%   BMI 36.40 kg/m²     Physical Exam  Constitutional:       General: She is not in acute distress.     Appearance: She is obese. She is not ill-appearing.   HENT:      Right Ear: There is impacted cerumen.      Ears:      Comments: Pain on insertion of otoscope, noted erythema     Nose: No rhinorrhea.      Mouth/Throat:      Mouth: Mucous membranes are moist.      Pharynx: " Oropharynx is clear.   Cardiovascular:      Pulses: Normal pulses.      Heart sounds: No murmur heard.  Pulmonary:      Effort: Pulmonary effort is normal. No respiratory distress.   Abdominal:      General: Abdomen is flat. Bowel sounds are normal.      Tenderness: There is no abdominal tenderness.   Neurological:      Mental Status: She is alert and oriented to person, place, and time.   Psychiatric:         Mood and Affect: Mood normal.         Behavior: Behavior normal.         Assessment/Plan   Problem List Items Addressed This Visit    None  Visit Diagnoses       Acute ear pain, right    -  Primary    Relevant Medications    ciprofloxacin-dexamethasone (CiproDEX) otic suspension    carbamide peroxide (Debrox) 6.5 % otic solution    Other Relevant Orders    Referral to ENT          Jeannine Moreno is a 69 y.o. female with PMHX of T2DM, HTN, Vit D deficiency, shingles with postherpetic neuralgia, bilateral legal blindness who presents for follow-up after an ED visit for complaints of headache and hyperglycemia on 10/31/24.    #Headache  #Right Ear pain  - Patient was seen in the the Ed  where they ordered CT head, CBC with dif, CMP, hA1C, flu, covid. Ordered tylenol 325 mg, reglan 10 mg for HA.  - Patients headache responded to the above medications and due to an unremarkable workup, she was discharged home  - Patient states her headache has returned and she now has intermittent right ear pain with no drainage or discharge.  - On exam there is notable erythema within the era caanal and the patient withdraws on advancement of the otoscope.   -  For pain control she takes Tylenol 500 mg once a day, but does not take it every day. Patient has not tried NSAIDs.   - Will order CiproDex drops and Debrox ear drops as above  - CT head reviewed and patient found to have abnormal anatomy, will refer to ENT    #Hyperglycemia  - In the ED, the patient was found to be hyperglycemic to 332  - A1C was 11.4%   - Patient was  given 5 units of insulin lispro for hyperglycemia.   - Patient has not been compliant with her medications. States she does not think she has been taking her Janumet or Semaglutide and could not recall the medications names  - Counseled patient on medications compliance    RTC in 1 month to follow up on pain and DM medication compliance    Patient discussed with attending physician Dr. Morse  Plan preliminary until cosigned by attending physician.    Anastacio Buchanan MD  Family Medicine  PGY-2

## 2024-11-12 ENCOUNTER — APPOINTMENT (OUTPATIENT)
Dept: PRIMARY CARE | Facility: CLINIC | Age: 69
End: 2024-11-12
Payer: COMMERCIAL

## 2024-11-14 NOTE — PROGRESS NOTES
I saw and evaluated the patient. I personally obtained the key and critical portions of the history and physical exam or was physically present for key and critical portions performed by the resident/fellow. I reviewed the resident/fellow's documentation and discussed the patient with the resident/fellow. I agree with the resident/fellow's medical decision making as documented in the note.    Joey Morse MD

## 2024-12-10 ENCOUNTER — APPOINTMENT (OUTPATIENT)
Dept: PRIMARY CARE | Facility: CLINIC | Age: 69
End: 2024-12-10
Payer: COMMERCIAL

## 2024-12-10 VITALS
BODY MASS INDEX: 36.07 KG/M2 | HEART RATE: 86 BPM | TEMPERATURE: 97 F | OXYGEN SATURATION: 94 % | DIASTOLIC BLOOD PRESSURE: 75 MMHG | WEIGHT: 196 LBS | SYSTOLIC BLOOD PRESSURE: 113 MMHG | HEIGHT: 62 IN

## 2024-12-10 DIAGNOSIS — E11.69 TYPE 2 DIABETES MELLITUS WITH OTHER SPECIFIED COMPLICATION, WITHOUT LONG-TERM CURRENT USE OF INSULIN: Primary | ICD-10-CM

## 2024-12-10 LAB — POC FINGERSTICK BLOOD GLUCOSE: 265 MG/DL (ref 70–100)

## 2024-12-10 RX ORDER — DULAGLUTIDE 0.75 MG/.5ML
0.75 INJECTION, SOLUTION SUBCUTANEOUS WEEKLY
Qty: 2 ML | Refills: 11 | Status: SHIPPED | OUTPATIENT
Start: 2024-12-10

## 2024-12-10 RX ORDER — SITAGLIPTIN AND METFORMIN HYDROCHLORIDE 50; 500 MG/1; MG/1
2 TABLET, FILM COATED, EXTENDED RELEASE ORAL 2 TIMES DAILY
Qty: 60 TABLET | Refills: 11 | Status: SHIPPED | OUTPATIENT
Start: 2024-12-10 | End: 2024-12-10 | Stop reason: WASHOUT

## 2024-12-10 RX ORDER — ACETAMINOPHEN 500 MG
TABLET ORAL
COMMUNITY
Start: 2024-01-03

## 2024-12-10 RX ORDER — METFORMIN HYDROCHLORIDE 500 MG/1
1000 TABLET, EXTENDED RELEASE ORAL DAILY
Qty: 100 TABLET | Refills: 11 | Status: SHIPPED | OUTPATIENT
Start: 2024-12-10 | End: 2026-01-14

## 2024-12-10 RX ORDER — METFORMIN HYDROCHLORIDE 500 MG/1
1000 TABLET, EXTENDED RELEASE ORAL
Qty: 100 TABLET | Refills: 11 | Status: SHIPPED | OUTPATIENT
Start: 2024-12-10 | End: 2024-12-10

## 2024-12-10 ASSESSMENT — ENCOUNTER SYMPTOMS
DEPRESSION: 1
OCCASIONAL FEELINGS OF UNSTEADINESS: 0
LOSS OF SENSATION IN FEET: 1

## 2024-12-10 ASSESSMENT — PAIN SCALES - GENERAL: PAINLEVEL_OUTOF10: 0-NO PAIN

## 2024-12-10 NOTE — PROGRESS NOTES
"Subjective   Patient ID: Jeannine Moreno is a 69 y.o. female who presents for Follow-up (Ear issues. ) and Diabetes.    HPI     Jeannine Moreno is a 69 y.o. female with PMHX of T2DM, HTN, Vit D deficiency, shingles with postherpetic neuralgia, bilateral legal blindness who presents for follow-up for DM.    Ms. Moreno denies any ear pain or headache this morning and reported that they resolved spontaneously. She's here to follow up on the medications that she has received for her other medical conditions. She is currently receiving Janumet ( mg), Atorvastatin (40 mg), vitamin D3 (2000 units), and Lisinopril (5 mg). Patient reports that she has not been taking that Janumet because of stomach cramping nor the Atorvastatin because it is packaged with the Janumet. Patient mentioned that there is an injectable medication that she would like to discuss at this meeting.    Patient additionally brought in erythromycin cream that she has not been able to apply and has not needed it. She reports that she is not sure what the erythromycin is for.    Review of Systems    Unremarkable    Objective   /75 (BP Location: Left arm, Patient Position: Sitting)   Pulse 86   Temp 36.1 °C (97 °F) (Temporal)   Ht 1.575 m (5' 2\")   Wt 88.9 kg (196 lb)   SpO2 94%   BMI 35.85 kg/m²     Physical Exam    Constitutional:       General: She is not in acute distress.     Appearance: She is obese. She is not ill-appearing.   HENT:      Right Ear: No pain today     Nose: No rhinorrhea.      Mouth/Throat:      Mouth: Mucous membranes are moist.      Pharynx: Oropharynx is clear.   Cardiovascular:      Pulses: Normal pulses.      Heart sounds: No murmur heard.  Pulmonary:      Effort: Pulmonary effort is normal. No respiratory distress.   Abdominal:      General: Abdomen is flat. Bowel sounds are normal.      Tenderness: There is no abdominal tenderness.   Neurological:      Mental Status: She is alert and oriented to person, " place, and time.   Psychiatric:         Mood and Affect: Mood normal.         Behavior: Behavior normal.     Assessment/Plan   69 y.o. female with PMHX of T2DM, HTN, Vit D deficiency, shingles with postherpetic neuralgia, and bilateral legal blindness who presents to the clinic for follow up on her DMII.  The patient's diabetes is currently uncontrolled with her last A1c 1 month ago elevated to 11.4.  As the patient is blind there appears to be some difficulties taking her medications.  She states that one of her packages had Janumet and atorvastatin.  She states that she believes the Janumet gives her diarrhea and indigestion and she does not know which one of the pills in the packages of Janumet so she does not take any of the pills in the package.  She was prescribed Rybelsus at the last appointment but does not appear to be taking it.  The patient brought in all of her medications and Rybelsus was not one of the medications and her pack.  We discussed possibly getting a home health aide to help her organize her medications but the patient declines as she is concerned that they will steal from her.  We discussed that a home health nurse would be a dependable resource for her as opposed to a home health aide.  Discontinued Janumet and prescribed XR version of metformin.  Also added a Trulicity injection.  Another reason for the nursing visit will be to help her with her injections.  However patient states that she would be able to do it independently.        #Headache  #Right ear pain  - patient endorsed that the pain has resolved spontaneously since last visit    #Hyperglycemia  # uncontrolled DMII  -Has not been taking Janumet because of stomach cramps  -Discontinue Janumet - take metformin in AM  -Trulicity injection once per week  -Home nurse to help with medications    RTC in 1 month to follow up on DMII and assess tolerance of trulicity    Seen and discussed with Dr. Banks,     Odalis Rossi MD, MPH    Family Medicine and Preventive Health, PGY-3

## 2024-12-13 DIAGNOSIS — E11.69 TYPE 2 DIABETES MELLITUS WITH OTHER SPECIFIED COMPLICATION, WITHOUT LONG-TERM CURRENT USE OF INSULIN: ICD-10-CM

## 2024-12-13 DIAGNOSIS — H54.3 BLINDNESS OF BOTH EYES: Primary | ICD-10-CM

## 2024-12-16 ENCOUNTER — APPOINTMENT (OUTPATIENT)
Dept: PHARMACY | Facility: HOSPITAL | Age: 69
End: 2024-12-16
Payer: COMMERCIAL

## 2024-12-16 DIAGNOSIS — E11.69 TYPE 2 DIABETES MELLITUS WITH OTHER SPECIFIED COMPLICATION, WITHOUT LONG-TERM CURRENT USE OF INSULIN: ICD-10-CM

## 2024-12-16 NOTE — ASSESSMENT & PLAN NOTE
Assessment:    Patient's DMT2 is currently uncontrolled based on most recent hemoglobin A1C level of 11.4% from 10/31/24. At most recent PCP visit, patient was prescribed metformin XR 1000mg PO QAM and Trulicity 0.75mg subcutaneous weekly, however patient has not yet picked-up meds and is thus not on any medication for glycemic control at this time. Of note, patient cannot tolerate Janumet.    Plan:    - Instructed patient to pick-up prescriptions for metformin XR 1000mg PO QAM and Trulicity 0.75mg subcutaneous weekly and subsequently start taking meds

## 2024-12-16 NOTE — PROGRESS NOTES
Primary Care Clinical Pharmacist Initial Visit      Date of Initial Visit: 12/16/24  Disease state(s) to be managed by clinical pharmacist: DMT2  Referring Provider: Dr. Katelin Banks (PCP)  Most recent appointment with PCP: 12/10/24  Next appointment with PCP: 2/3/25            Subjective:    Patient is a 68 YO F who presents virtually to visit with clinical pharmacist for initial visit for management of DMT2. Patient gives consent to have visit conducted via telehealth. Patient was referred to clinical pharmacist for management of disease state(s) by PCP. At today's visit, patient reports she is currently not taking any medication for glycemic control (at most recent PCP visit, patient was prescribed metformin XR 1000mg PO QAM and Trulicity 0.75mg subcutaneous weekly, however patient states that she has not yet picked-up meds). Of note, patient cannot tolerate Janumet.    Patient does not check glucose level at home    Objective:    Most recent hemoglobin A1C level: 11.4% (10/31/24) (goal: less than 7%)    Most recent eGFR: 87 (10/31/24)  Most recent vitamin B12 level:  none on file for the past 12 months  Most recent Albumin/SCr ratio: none on file for the past 12 months (patient already receiving nephroprotection via lisinopril 5mg PO daily)  Most recent AST/ALT: 11/13 (10/31/24)  Most recent influenza vaccine: none on file  Most recent pneumococcal vaccine: 4/16/24 (PCV20)      *No lipid panel on file for the past 12 months*       Assessment:    Patient's DMT2 is currently uncontrolled based on most recent hemoglobin A1C level of 11.4% from 10/31/24. At most recent PCP visit, patient was prescribed metformin XR 1000mg PO QAM and Trulicity 0.75mg subcutaneous weekly, however patient has not yet picked-up meds and is thus not on any medication for glycemic control at this time. Of note, patient  cannot tolerate Janumet.    Plan:    - Instructed patient to pick-up prescriptions for metformin XR 1000mg PO QAM and Trulicity 0.75mg subcutaneous weekly and subsequently start taking meds       The patient verbalized understanding of everything discussed at today's visit and agrees with plan formulated by clinical pharmacist    Next visit with clinical pharmacist:  1/8/25 at 10 AM via telehealth      Continue all meds under the continuation of care with the referring provider and clinical pharmacy team.    Patient Education    The patient was counseled on the following regarding DMT2:    - The side effects of the medications patient uses to treat disease state(s) and what to do if they occur        LEN Mckeon, PharmD, CPh, BCACP  Clinical Pharmacy Specialist, [facility]

## 2024-12-17 ENCOUNTER — DOCUMENTATION (OUTPATIENT)
Dept: HOME HEALTH SERVICES | Facility: HOME HEALTH | Age: 69
End: 2024-12-17
Payer: COMMERCIAL

## 2024-12-17 ENCOUNTER — HOME HEALTH ADMISSION (OUTPATIENT)
Dept: HOME HEALTH SERVICES | Facility: HOME HEALTH | Age: 69
End: 2024-12-17
Payer: COMMERCIAL

## 2024-12-17 NOTE — PROGRESS NOTES
Primary Care Clinical Pharmacist Follow-up Visit      Date of follow-up visit: 1/8/25  Date of Initial Visit: 12/16/24  Disease state(s) to be managed by clinical pharmacist: DMT2  Referring Provider: Dr. Katelin Banks  PCP: Dr. Odalis Rossi   Most recent appointment with PCP: 12/10/24  Next appointment with PCP: 2/3/25            Subjective:    Patient is a 68 YO F who presents virtually to visit with clinical pharmacist for follow-up visit for management of DMT2. Patient gives consent to have visit conducted via telehealth. Patient was referred to clinical pharmacist for management of disease state(s) by PCP. At last visit with clinical pharmacist, patient was instructed to start metformin XR 1000mg PO QAM and Trulicity 0.75mg subcutaneous weekly.  At today's visit, patient reports she has not started taking metformin XR 1000mg PO QAM or Trulicity 0.75mg subcutaneous weekly. Patient states she has metformin at home at home but she does not know she has to start taking metformin, and she states Trulicity might be delivered to the old address so she did not receive the med. Of note, patient cannot tolerate Janumet.    Patient does not check glucose level at home    Objective:    Most recent hemoglobin A1C level: 11.4% (10/31/24) (goal: less than 7%)    Most recent eGFR: 87 (10/31/24)  Most recent vitamin B12 level:  none on file for the past 12 months  Most recent Albumin/SCr ratio: none on file for the past 12 months (patient already receiving nephroprotection via lisinopril 5mg PO daily)  Most recent AST/ALT: 11/13 (10/31/24)  Most recent influenza vaccine: none on file  Most recent pneumococcal vaccine: 4/16/24 (PCV20)      *No lipid panel on file for the past 12 months*       Assessment:    Patient's DMT2 is currently uncontrolled based on most recent hemoglobin A1C level of 11.4% from 10/31/24. At today's visit, patient reports she has not started taking metformin  XR 1000mg PO QAM or Trulicity 0.75mg subcutaneous weekly. Patient states she has metformin at home at home but she does not know she has to start taking metformin, and she states Trulicity might be delivered to the old address so she did not receive the med. Of note, patient cannot tolerate Janumet.    Plan:  - Instructed patient to start taking metformin XR 1000mg PO QAM  - Instructed patient to call the pharmacy to update delivery address and start taking Trulicity 0.75mg subcutaneous weekly after receiving the med  - Instructed patient to go to lab to have albumin/creatinine ratio, A1C, lipid panel, vitamin B12 checked prior to next visit with clinical pharmacist    The patient verbalized understanding of everything discussed at today's visit and agrees with plan formulated by clinical pharmacist    Next visit with clinical pharmacist:  1/22/25 at 10 AM via telehealth      Continue all meds under the continuation of care with the referring provider and clinical pharmacy team.    Patient Education    The patient was counseled on the following regarding DMT2:    - The side effects of the medications patient uses to treat disease state(s) and what to do if they occur    Osvaldo Matthews PharmD  [employer] PGY1 pharmacy resident  [facility]    Addendum:     I have reviewed the resident's progress note associated with the patient's visit, and I agree with the resident's assessment and plan.      Jeffery Olguin, PharmD, CP, BCACP  Clinical Pharmacy Specialist  [facility]

## 2024-12-17 NOTE — HH CARE COORDINATION
Home Care received a Referral for Nursing, Home Health Aide, and Medical Social Work. We have processed the referral for a Start of Care on 12/18-12/19.     If you have any questions or concerns, please feel free to contact us at [phone]. Follow the prompts, enter your five digit zip code, and you will be directed to your care team on [facility].

## 2024-12-31 ENCOUNTER — HOME CARE VISIT (OUTPATIENT)
Dept: HOME HEALTH SERVICES | Facility: HOME HEALTH | Age: 69
End: 2024-12-31
Payer: COMMERCIAL

## 2024-12-31 PROCEDURE — G0299 HHS/HOSPICE OF RN EA 15 MIN: HCPCS

## 2025-01-01 VITALS
BODY MASS INDEX: 36.07 KG/M2 | SYSTOLIC BLOOD PRESSURE: 110 MMHG | HEART RATE: 92 BPM | DIASTOLIC BLOOD PRESSURE: 78 MMHG | HEIGHT: 62 IN | TEMPERATURE: 98.2 F | OXYGEN SATURATION: 95 % | RESPIRATION RATE: 16 BRPM | WEIGHT: 196 LBS

## 2025-01-01 ASSESSMENT — ENCOUNTER SYMPTOMS
PAIN LOCATION: BACK
APPETITE LEVEL: GOOD
MUSCLE WEAKNESS: 1
PERSON REPORTING PAIN: PATIENT
PAIN: 1
PAIN LOCATION - PAIN SEVERITY: 8/10
LAST BOWEL MOVEMENT: 67204
LIMITED RANGE OF MOTION: 1
PAIN SEVERITY GOAL: 0/10
CHANGE IN APPETITE: UNCHANGED

## 2025-01-01 ASSESSMENT — ACTIVITIES OF DAILY LIVING (ADL)
AMBULATION ASSISTANCE: ONE PERSON
OASIS_M1830: 03
ENTERING_EXITING_HOME: TOTAL DEPENDENCE
CURRENT_FUNCTION: ONE PERSON

## 2025-01-02 ENCOUNTER — HOME CARE VISIT (OUTPATIENT)
Dept: HOME HEALTH SERVICES | Facility: HOME HEALTH | Age: 70
End: 2025-01-02
Payer: COMMERCIAL

## 2025-01-08 ENCOUNTER — APPOINTMENT (OUTPATIENT)
Dept: PHARMACY | Facility: HOSPITAL | Age: 70
End: 2025-01-08
Payer: COMMERCIAL

## 2025-01-08 NOTE — ASSESSMENT & PLAN NOTE
Assessment:    Patient's DMT2 is currently uncontrolled based on most recent hemoglobin A1C level of 11.4% from 10/31/24. At today's visit, patient reports she has not started taking metformin XR 1000mg PO QAM or Trulicity 0.75mg subcutaneous weekly. Patient states she has metformin at home at home but she does not know she has to start taking metformin, and she states Trulicity might be delivered to the old address so she did not receive the med. Of note, patient cannot tolerate Janumet.    Plan:  - Instructed patient to start taking metformin XR 1000mg PO QAM  - Instructed patient to call the pharmacy to update delivery address and start taking Trulicity 0.75mg subcutaneous weekly after receiving the med  - Instructed patient to go to lab to have albumin/creatinine ratio, A1C, lipid panel, vitamin B12 checked prior to next visit with clinical pharmacist

## 2025-01-10 ENCOUNTER — HOME CARE VISIT (OUTPATIENT)
Dept: HOME HEALTH SERVICES | Facility: HOME HEALTH | Age: 70
End: 2025-01-10
Payer: COMMERCIAL

## 2025-01-17 ENCOUNTER — LAB REQUISITION (OUTPATIENT)
Dept: LAB | Facility: HOSPITAL | Age: 70
End: 2025-01-17
Payer: COMMERCIAL

## 2025-01-17 ENCOUNTER — HOME CARE VISIT (OUTPATIENT)
Dept: HOME HEALTH SERVICES | Facility: HOME HEALTH | Age: 70
End: 2025-01-17
Payer: COMMERCIAL

## 2025-01-17 DIAGNOSIS — E11.9 TYPE 2 DIABETES MELLITUS WITHOUT COMPLICATIONS (MULTI): ICD-10-CM

## 2025-01-17 LAB
CHOLEST SERPL-MCNC: 207 MG/DL (ref 0–199)
CHOLESTEROL/HDL RATIO: 4.8
CREAT UR-MCNC: 159 MG/DL (ref 20–320)
EST. AVERAGE GLUCOSE BLD GHB EST-MCNC: 275 MG/DL
HBA1C MFR BLD: 11.2 %
HDLC SERPL-MCNC: 43 MG/DL
LDLC SERPL CALC-MCNC: 121 MG/DL
MICROALBUMIN UR-MCNC: 18 MG/L
MICROALBUMIN/CREAT UR: 11.3 UG/MG CREAT
NON HDL CHOLESTEROL: 164 MG/DL (ref 0–149)
TRIGL SERPL-MCNC: 216 MG/DL (ref 0–149)
VIT B12 SERPL-MCNC: 344 PG/ML (ref 211–911)
VLDL: 43 MG/DL (ref 0–40)

## 2025-01-17 PROCEDURE — 82607 VITAMIN B-12: CPT

## 2025-01-17 PROCEDURE — 82570 ASSAY OF URINE CREATININE: CPT

## 2025-01-17 PROCEDURE — 83036 HEMOGLOBIN GLYCOSYLATED A1C: CPT

## 2025-01-17 PROCEDURE — G0300 HHS/HOSPICE OF LPN EA 15 MIN: HCPCS

## 2025-01-17 PROCEDURE — 80061 LIPID PANEL: CPT

## 2025-01-17 PROCEDURE — 82043 UR ALBUMIN QUANTITATIVE: CPT

## 2025-01-17 ASSESSMENT — ENCOUNTER SYMPTOMS
APPETITE LEVEL: GOOD
DENIES PAIN: 1
LAST BOWEL MOVEMENT: 67222
LOSS OF VISUAL FIELD: 1
PERSON REPORTING PAIN: PATIENT

## 2025-01-17 ASSESSMENT — PAIN SCALES - PAIN ASSESSMENT IN ADVANCED DEMENTIA (PAINAD)
BODYLANGUAGE: 0 - RELAXED.
NEGVOCALIZATION: 0 - NONE.
FACIALEXPRESSION: 0
FACIALEXPRESSION: 0 - SMILING OR INEXPRESSIVE.
TOTALSCORE: 0
NEGVOCALIZATION: 0
CONSOLABILITY: 0 - NO NEED TO CONSOLE.
CONSOLABILITY: 0
BODYLANGUAGE: 0
BREATHING: 0

## 2025-01-22 ENCOUNTER — APPOINTMENT (OUTPATIENT)
Dept: PHARMACY | Facility: HOSPITAL | Age: 70
End: 2025-01-22
Payer: COMMERCIAL

## 2025-01-22 DIAGNOSIS — E11.9 TYPE 2 DIABETES MELLITUS WITHOUT COMPLICATION, WITHOUT LONG-TERM CURRENT USE OF INSULIN (MULTI): ICD-10-CM

## 2025-01-22 NOTE — ASSESSMENT & PLAN NOTE
Discussion: Angela's T2DM is uncontrolled based on the most recent A1C above the goal(11.2%, 1/17/25). Patient is not currently checking BG at home. At today's visit, patient reports taking metformin XR 1000mg PO QAM, and she states she has not started taking Trulicity yet because she did not receive the med. Patient reports compliance and denies having ADRs to metformin. Patient states she is not taking atorvastatin now due to sore throat it is hard for her to swallow the tablets.    Plan:   Instructed patient to call the pharmacy to confirm delivery address and delivery time, and start taking Trulicity 0.75mg subcutaneous weekly after receiving the med  Resume atorvastatin 40mg PO daily  Continue metformin XR 1000mg PO QAM

## 2025-01-22 NOTE — PROGRESS NOTES
Primary Care Clinical Pharmacist Follow-Up Visit   Patient ID: Jeannine Moreno is a 69 y.o. female who presents for No chief complaint on file..    Patient was referred to clinical pharmacy for management of type 2 diabetes. Patient gives consent to have visit conducted via telehealth. At last pharmacy appointment, patient was instructed to start Trulicity 0.75mg subcutaneous weekly and continue metformin XR 1000mg PO QAM. Of note, patient cannot tolerate Janumet.   At today's visit, patient reports taking metformin XR 1000mg PO QAM and Trulicity 0.75mg subcutaneous weekly.    Date of Follow-Up Visit: 2/5/25   Date of Initial Visit: 12/16/24   Referring provider: Katelin Banks MD  Most recent PCP appointment: 12/10/24 or 2/3/25  ??  Next appointment with PCP: ***    Subjective     Diet: 2 meals/day  Breakfast: cereal, oat meal, grullon  Dinner: beef stew with rice  Snack: potato chip  Drink: regular soda, tea with sugar, juice    Exercise: None    Allergies   Allergen Reactions    Janumet [Sitagliptin Phos-Metformin] Diarrhea     Janumet caused diarrhea and indigestion    Oxycodone-Acetaminophen Other    Acetaminophen GI Upset    Oxycodone GI Upset       Current DM pharmacotherapy:   - metformin XR 1000mg PO QAM  - Trulicity 0.75mg subcutaneous   - Patient has not started taking Trulicity  Adherence:  Patient is taking metformin as directed and reports no missed doses  Adverse Effects: None    Secondary prevention:  - Statin? Yes  - Atorvastatin 40mg PO daily   - Patient states she is not taking atorvastatin now due to sore throat it is hard for her to swallow the tablets  - ACE-I/ARB? Yes   - Lisinopril 5mg PO daily  - Aspirin? No    Current monitoring regimen:     Patient do not check BG at home.     Any episodes of hypoglycemia? No  Hypoglycemia awareness? Yes    Pertinent PMH review:  - PMH of Pancreatitis: No  - PMH/FH of Medullary Thyroid Cancer: No  - PMH/FH of Multiple Endocrine Neoplasia (MEN) Type II:  "No  - PMH of Retinopathy: No  - PMH of Urinary Tract Infections/Yeast Infections: Yes    Last optometry exam: Per patient last eye exam was last  and no diabetic retinopathy noticed  Most recent visit in Podiatry:   Most recent influenza vaccine: none on file  Most recent pneumococcal vaccine: 24 (PCV20)    Patient goals:  - Fasting B - 130 mg/dL  - Postprandial BG: less than 180 mg/dL  - A1c less than 7%    Objective   Lab Review  Lab Results   Component Value Date    BILITOT 0.3 10/31/2024    CALCIUM 8.6 10/31/2024    CO2 23 10/31/2024     10/31/2024    CREATININE 0.74 10/31/2024    GLUCOSE 332 (H) 10/31/2024    ALKPHOS 105 10/31/2024    K 4.1 10/31/2024    PROT 6.6 10/31/2024     10/31/2024    AST 11 10/31/2024    ALT 13 10/31/2024    BUN 15 10/31/2024    ANIONGAP 16 10/31/2024    PHOS 3.5 2023    ALBUMIN 3.7 10/31/2024    GFRF >90 2023     Lab Results   Component Value Date    TRIG 216 (H) 2025    CHOL 207 (H) 2025    LDLCALC 121 (H) 2025    HDL 43.0 2025     Lab Results   Component Value Date    HGBA1C 11.2 (H) 2025     Lab Results   Component Value Date    EGFR 87 10/31/2024     No components found for: \"UACR\"  BP Readings from Last 3 Encounters:   25 (P) 92/60   24 110/78   12/10/24 113/75     BMI Readings from Last 1 Encounters:   24 35.85 kg/m²     The 10-year ASCVD risk score (Perez SANDERS, et al., 2019) is: 15.9%    Values used to calculate the score:      Age: 69 years      Sex: Female      Is Non- : Yes      Diabetic: Yes      Tobacco smoker: No      Systolic Blood Pressure: 92 mmHg      Is BP treated: Yes      HDL Cholesterol: 43 mg/dL      Total Cholesterol: 207 mg/dL    Assessment/Plan   Problem List Items Addressed This Visit    None        Type 2 diabetes mellitus is not at goal.   Current A1C: 11.2% (25)  Goal A1C: <7%    Follow-up: in *** weeks; *** via telehealth  Future " considerations: Consider increase metformin to 1000mg PO BID if T2DM remain uncontrolled when patient on both metformin 1000mg daily and Trulicity 0.75mg subcutaneous weekly. Consider SGLT2 inhibitor if patient cannot tolerate GLP-1 agonist.    Siva Matthews RPh, PharmD  [employer] PGY-1 Pharmacy Resident  [facility]

## 2025-01-22 NOTE — PROGRESS NOTES
Primary Care Clinical Pharmacist Follow-Up Visit   Patient ID: Jeannine Moreno is a 69 y.o. female who presents for Diabetes.    Patient was referred to clinical pharmacy for management of type 2 diabetes. Patient gives consent to have visit conducted via telehealth. At last pharmacy appointment, patient was instructed to start metformin XR 1000mg PO QAM and Trulicity 0.75mg subcutaneous weekly. Of note, patient cannot tolerate Janumet.   At today's visit, patient reports taking metformin XR 1000mg PO QAM, and she states she has not started taking Trulicity yet because she did not receive the med..     Date of Follow-Up Visit: 1/22/25   Date of Initial Visit: 12/16/24   Referring provider: Katelin Banks MD  Most recent PCP appointment: 12/10/24   Next appointment with PCP: 2/3/25      Subjective     Diet: 2 meals/day  Breakfast: cereal, oat meal, grullon  Dinner: beef stew with rice  Snack: potato chip  Drink: regular soda, tea with sugar, juice    Exercise: None    Allergies   Allergen Reactions    Janumet [Sitagliptin Phos-Metformin] Diarrhea     Janumet caused diarrhea and indigestion    Oxycodone-Acetaminophen Other    Acetaminophen GI Upset    Oxycodone GI Upset       Current DM pharmacotherapy:   - metformin XR 1000mg PO QAM  - Trulicity 0.75mg subcutaneous   - Patient has not started taking Trulicity  Adherence:  Patient is taking metformin as directed and reports no missed doses  Adverse Effects: None    Secondary prevention:  - Statin? Yes  - Atorvastatin 40mg PO daily   - Patient states she is not taking atorvastatin now due to sore throat it is hard for her to swallow the tablets  - ACE-I/ARB? Yes   - Lisinopril 5mg PO daily  - Aspirin? No    Current monitoring regimen:     Patient do not check BG at home.     Any episodes of hypoglycemia? No  Hypoglycemia awareness? Yes    Pertinent PMH review:  - PMH of Pancreatitis: No  - PMH/FH of Medullary Thyroid Cancer: No  - PMH/FH of Multiple Endocrine  "Neoplasia (MEN) Type II: No  - PMH of Retinopathy: No  - PMH of Urinary Tract Infections/Yeast Infections: Yes    Last optometry exam: Per patient last eye exam was last  and no diabetic retinopathy notice  Most recent visit in Podiatry:   Most recent influenza vaccine: none on file  Most recent pneumococcal vaccine: 24 (PCV20)    Patient goals:  - Fasting B - 130 mg/dL  - Postprandial BG: less than 180 mg/dL  - A1c less than 7%    Objective   Lab Review  Lab Results   Component Value Date    BILITOT 0.3 10/31/2024    CALCIUM 8.6 10/31/2024    CO2 23 10/31/2024     10/31/2024    CREATININE 0.74 10/31/2024    GLUCOSE 332 (H) 10/31/2024    ALKPHOS 105 10/31/2024    K 4.1 10/31/2024    PROT 6.6 10/31/2024     10/31/2024    AST 11 10/31/2024    ALT 13 10/31/2024    BUN 15 10/31/2024    ANIONGAP 16 10/31/2024    PHOS 3.5 2023    ALBUMIN 3.7 10/31/2024    GFRF >90 2023     Lab Results   Component Value Date    TRIG 216 (H) 2025    CHOL 207 (H) 2025    LDLCALC 121 (H) 2025    HDL 43.0 2025     Lab Results   Component Value Date    HGBA1C 11.2 (H) 2025     Lab Results   Component Value Date    EGFR 87 10/31/2024     No components found for: \"UACR\"  BP Readings from Last 3 Encounters:   25 (P) 92/60   24 110/78   12/10/24 113/75     BMI Readings from Last 1 Encounters:   24 35.85 kg/m²     The 10-year ASCVD risk score (Perez SANDERS, et al., 2019) is: 15.9%    Values used to calculate the score:      Age: 69 years      Sex: Female      Is Non- : Yes      Diabetic: Yes      Tobacco smoker: No      Systolic Blood Pressure: 92 mmHg      Is BP treated: Yes      HDL Cholesterol: 43 mg/dL      Total Cholesterol: 207 mg/dL    Assessment/Plan   Problem List Items Addressed This Visit       DM2 (diabetes mellitus, type 2) (Multi)     Discussion: Angela's T2DM is uncontrolled based on the most recent A1C above the goal(11.2%, " 1/17/25). Patient is not currently checking BG at home. At today's visit, patient reports taking metformin XR 1000mg PO QAM, and she states she has not started taking Trulicity yet because she did not receive the med. Patient reports compliance and denies having ADRs to metformin. Patient states she is not taking atorvastatin now due to sore throat it is hard for her to swallow the tablets.    Plan:   Instructed patient to call the pharmacy to confirm delivery address and delivery time, and start taking Trulicity 0.75mg subcutaneous weekly after receiving the med  Resume atorvastatin 40mg PO daily  Continue metformin XR 1000mg PO QAM            Type 2 diabetes mellitus is not at goal.   Current A1C: 11.2% (1/17/25)  Goal A1C: <7%    Follow-up: in 2 weeks; 2/5/25 at 10 AM via telehealth  Future considerations: Consider increase metformin to 1000mg PO BID if T2DM remain uncontrolled when patient on both metformin 1000mg daily and Trulicity 0.75mg subcutaneous weekly. Consider SGLT2 inhibitor if patient cannot tolerate GLP-1 agonist.    Siva Matthews [facility], PharmD  [employer] PGY-1 Pharmacy Resident  [facility]       Addendum:     I have reviewed the resident's progress note associated with the patient's visit, and I agree with the resident's assessment and plan.      Jeffery Olguin, PharmD, CP, BCACP  Clinical Pharmacy Specialist  [facility]

## 2025-01-24 ENCOUNTER — HOME CARE VISIT (OUTPATIENT)
Dept: HOME HEALTH SERVICES | Facility: HOME HEALTH | Age: 70
End: 2025-01-24
Payer: COMMERCIAL

## 2025-01-24 VITALS
SYSTOLIC BLOOD PRESSURE: 119 MMHG | OXYGEN SATURATION: 96 % | TEMPERATURE: 97.3 F | DIASTOLIC BLOOD PRESSURE: 74 MMHG | RESPIRATION RATE: 18 BRPM | HEART RATE: 74 BPM

## 2025-01-24 PROCEDURE — G0300 HHS/HOSPICE OF LPN EA 15 MIN: HCPCS

## 2025-01-24 ASSESSMENT — ENCOUNTER SYMPTOMS
DENIES PAIN: 1
APPETITE LEVEL: GOOD
LAST BOWEL MOVEMENT: 67228
LOSS OF VISUAL FIELD: 1
PERSON REPORTING PAIN: PATIENT
CHANGE IN APPETITE: UNCHANGED

## 2025-01-24 ASSESSMENT — PAIN SCALES - PAIN ASSESSMENT IN ADVANCED DEMENTIA (PAINAD)
NEGVOCALIZATION: 0 - NONE.
CONSOLABILITY: 0
FACIALEXPRESSION: 0
FACIALEXPRESSION: 0 - SMILING OR INEXPRESSIVE.
NEGVOCALIZATION: 0
TOTALSCORE: 0
BREATHING: 0
CONSOLABILITY: 0 - NO NEED TO CONSOLE.
BODYLANGUAGE: 0
BODYLANGUAGE: 0 - RELAXED.

## 2025-02-03 ENCOUNTER — APPOINTMENT (OUTPATIENT)
Dept: PRIMARY CARE | Facility: CLINIC | Age: 70
End: 2025-02-03
Payer: COMMERCIAL

## 2025-02-03 ENCOUNTER — HOME CARE VISIT (OUTPATIENT)
Dept: HOME HEALTH SERVICES | Facility: HOME HEALTH | Age: 70
End: 2025-02-03
Payer: COMMERCIAL

## 2025-02-03 VITALS
DIASTOLIC BLOOD PRESSURE: 78 MMHG | OXYGEN SATURATION: 97 % | TEMPERATURE: 97.8 F | SYSTOLIC BLOOD PRESSURE: 126 MMHG | RESPIRATION RATE: 18 BRPM | HEART RATE: 65 BPM

## 2025-02-03 VITALS
TEMPERATURE: 97 F | HEIGHT: 62 IN | HEART RATE: 97 BPM | DIASTOLIC BLOOD PRESSURE: 83 MMHG | WEIGHT: 193 LBS | BODY MASS INDEX: 35.51 KG/M2 | SYSTOLIC BLOOD PRESSURE: 126 MMHG | OXYGEN SATURATION: 92 %

## 2025-02-03 DIAGNOSIS — K21.9 GASTROESOPHAGEAL REFLUX DISEASE WITHOUT ESOPHAGITIS: Primary | ICD-10-CM

## 2025-02-03 DIAGNOSIS — E11.69 TYPE 2 DIABETES MELLITUS WITH OTHER SPECIFIED COMPLICATION, WITHOUT LONG-TERM CURRENT USE OF INSULIN: ICD-10-CM

## 2025-02-03 PROCEDURE — 99213 OFFICE O/P EST LOW 20 MIN: CPT | Mod: GE

## 2025-02-03 PROCEDURE — 1159F MED LIST DOCD IN RCRD: CPT

## 2025-02-03 PROCEDURE — 3074F SYST BP LT 130 MM HG: CPT

## 2025-02-03 PROCEDURE — 3008F BODY MASS INDEX DOCD: CPT

## 2025-02-03 PROCEDURE — 3049F LDL-C 100-129 MG/DL: CPT

## 2025-02-03 PROCEDURE — 3046F HEMOGLOBIN A1C LEVEL >9.0%: CPT

## 2025-02-03 PROCEDURE — 3079F DIAST BP 80-89 MM HG: CPT

## 2025-02-03 PROCEDURE — 99213 OFFICE O/P EST LOW 20 MIN: CPT

## 2025-02-03 PROCEDURE — 3061F NEG MICROALBUMINURIA REV: CPT

## 2025-02-03 PROCEDURE — G0299 HHS/HOSPICE OF RN EA 15 MIN: HCPCS

## 2025-02-03 PROCEDURE — 1126F AMNT PAIN NOTED NONE PRSNT: CPT

## 2025-02-03 PROCEDURE — 4010F ACE/ARB THERAPY RXD/TAKEN: CPT

## 2025-02-03 RX ORDER — METFORMIN HYDROCHLORIDE 1000 MG/1
1000 TABLET ORAL
Qty: 100 TABLET | Refills: 11 | Status: SHIPPED | OUTPATIENT
Start: 2025-02-03 | End: 2026-03-10

## 2025-02-03 RX ORDER — SITAGLIPTIN AND METFORMIN HYDROCHLORIDE 1000; 100 MG/1; MG/1
1 TABLET, FILM COATED, EXTENDED RELEASE ORAL
COMMUNITY
Start: 2024-12-11 | End: 2025-02-03 | Stop reason: WASHOUT

## 2025-02-03 ASSESSMENT — PAIN SCALES - GENERAL: PAINLEVEL_OUTOF10: 0-NO PAIN

## 2025-02-03 ASSESSMENT — ACTIVITIES OF DAILY LIVING (ADL)
OASIS_M1830: 00
HOME_HEALTH_OASIS: 04

## 2025-02-03 ASSESSMENT — ENCOUNTER SYMPTOMS
DENIES PAIN: 1
PERSON REPORTING PAIN: PATIENT
PAIN SEVERITY GOAL: 0/10

## 2025-02-03 NOTE — PROGRESS NOTES
"Subjective   Patient ID: Jeannine Moreno is a 69 y.o. female who presents for Follow-up.    HPI   # DMII   - currently on trulicity 0.75. Has taken 2 doses. Denies adverse effects.   - taking metformin 500 mg ( 2 tablets) every day     # urge incontinence   - has had urge incontinence x 1 year   - denies dysuria, or suprapubic tenderness   - needs seat lifts as seat is too low in her new apartment     # coughing when laying down  - eats at 5 pm. No late night snacking.   - does not have acidic taste when waking up in the AM.   - does feel like she has phlegm in the throat.   - does not drink coffee. Drinks hot chocolate at 4 AM.       Objective   /83 (BP Location: Left arm, Patient Position: Sitting)   Pulse 97   Temp 36.1 °C (97 °F) (Temporal)   Ht 1.575 m (5' 2\")   Wt 87.5 kg (193 lb)   SpO2 92%   BMI 35.30 kg/m²     Physical Exam    Assessment/Plan   69 y.o. female with PMHX of T2DM, HTN, Vit D deficiency, shingles with postherpetic neuralgia, and bilateral legal blindness who presents to the clinic for follow up on her DMII.  Pt was previously not tolerating her janumet but was not able to see which pill was the janumet in her pill packets and was not taking any of the medications. Therefore pt was referred to skilled nursing care to help with adherence to medication. Pt was not amenable to [facility] as she previously had adverse experiences with them. The pt was asked to come in for a close follow up today in order to ensure that she was taking her medications as stated. When pt came in to the clinic her pill packet stated that she was on the \"Sunday evening\"dose. Per pt she was taking her meds without skipping doses. Reached out to skilled nursing about extending their services to ensure that pt was taking her medications appropriately. However they stated that this would not be covered by insurance. Pt had one more session with nurse remaining and I reached out to nurse to teach pt how to put her pills " in a pill box for ease of taking her medications. I also discontinued metformin  mg ( 2 tablets) and started metformin 1000 mg ( 1 tablet) for ease of taking he medication. She was amenable to plan. Will not increase trulicity dose at this time as pt has only taken 2 doses but pt states that she feels comfortable taking her IM medication. Her previous HbA1c was 11.2 ( 2 weeks ago). Will obtain repeat A1c for follow-up. Pt also stated that she has been coughing at night. Though she does not eat late or drink coffee/ alc, she does drink hot chocolate which may exacerbate sx. Pt was told about lifestyle mgmt and will be started on pepcid.     Problem List Items Addressed This Visit       DM2 (diabetes mellitus, type 2) (Multi)    Relevant Medications    metFORMIN (Glucophage) 1,000 mg tablet     # GERD   - start pepcid BID   - will assess whether sx resolve at follow up     # HM   # colonoscopy   - 2016 had it done in [facility]. Per pt this was a normal colonoscopy     # blindness   # physical decompensation   pt states that she needs a seat life for as the toilet is too low  Pt would benefit from a full safety assessment with PT   - will order chair lift with DME   - will reach out to pt whether she would be amenable to PT coming in to do a safety assessment of her  home.     RTC in 3 months for DM follow up     Discussed with Dr. Morse,     Odalis Rossi MD, MPH   Family Medicine and Preventive Health, PGY-3

## 2025-02-04 RX ORDER — FAMOTIDINE 20 MG/1
20 TABLET, FILM COATED ORAL 2 TIMES DAILY
Qty: 60 TABLET | Refills: 5 | Status: SHIPPED | OUTPATIENT
Start: 2025-02-04 | End: 2025-08-03

## 2025-02-05 ENCOUNTER — APPOINTMENT (OUTPATIENT)
Dept: PHARMACY | Facility: HOSPITAL | Age: 70
End: 2025-02-05
Payer: COMMERCIAL

## 2025-02-05 DIAGNOSIS — E11.9 TYPE 2 DIABETES MELLITUS WITHOUT COMPLICATION, WITHOUT LONG-TERM CURRENT USE OF INSULIN (MULTI): ICD-10-CM

## 2025-02-05 DIAGNOSIS — E11.69 TYPE 2 DIABETES MELLITUS WITH OTHER SPECIFIED COMPLICATION, WITHOUT LONG-TERM CURRENT USE OF INSULIN: ICD-10-CM

## 2025-02-05 RX ORDER — DULAGLUTIDE 0.75 MG/.5ML
0.75 INJECTION, SOLUTION SUBCUTANEOUS WEEKLY
Qty: 2 ML | Refills: 11 | Status: SHIPPED | OUTPATIENT
Start: 2025-02-05

## 2025-02-05 NOTE — Clinical Note
Jenny Rossi, Per chart, Trulicity was discontinued by on 2/3/25. During the pharmacy appointment today, patient denies having any ADRs to Trulicity, and she states she was not told to stop taking Trulicity the last PCP appointment on 2/3/25. Just want to ask if the patient should be continue taking Trulicity? Thank you and please let me know if there is any question or concern.

## 2025-02-05 NOTE — ASSESSMENT & PLAN NOTE
Discussion: Angela's T2DM is uncontrolled based on the most recent A1C above the goal(11.2%, 1/17/25). Patient is not currently checking BG at home. At today's visit, patient reports taking metformin 1000mg PO QAM and Trulicity 0.75mg subcutaneous weekly, she reports compliance and denies having ADRs to the meds. Patient states her third dose of Trulicity 0.75mg will be this Friday(2/23/25).  Per chart, Trulicity was discontinued by Dr. Rossi on 2/3/25. During the pharmacy appointment today, patient denies having any ADRs to Trulicity and Dr. Rossi did not tell her to stop taking Trulicity the last PCP appointment on 2/3/25.    Plan:   Increase metformin from 1000mg PO QAM to 1000mg PO BID  Continue Trulicity 0.75mg subcutaneous weekly  Will reach out to Dr. Rossi regarding the discontinuation of Trulicity prescription

## 2025-02-05 NOTE — PROGRESS NOTES
Primary Care Clinical Pharmacist Follow-Up Visit   Patient ID: Jeannine Moreno is a 69 y.o. female who presents for No chief complaint on file..    Patient was referred to clinical pharmacy for management of type 2 diabetes. Patient gives consent to have visit conducted via telehealth. At last pharmacy appointment, patient was instructed to increase metformin from 1000mg PO QAM to 1000mg PO BID and continue Trulicity 0.75mg subcutaneous weekly. Of note, patient cannot tolerate Janumet.   Per chart, Trulicity was discontinued on 2/3/25. During last pharmacy appointment, patient denied having any ADRs to Trulicity and her PCP did not tell her to stop taking Trulicity the last PCP appointment on 2/3/25. After discussion with patient's PCP, Trulicity 0.75mg subcutaneous weekly was added back to patient's antidiabetic regimen.    At today's pharmacy appointment, patient reports    Date of Follow-Up Visit: 2/5/25   Date of Initial Visit: 12/16/24   Referring provider: Katelin Banks MD  Most recent PCP appointment: 2/3/25   Next appointment with PCP: 5/2/25     Subjective   HPI    Diet: 2 meals/day  Breakfast: cereal, oat meal, grullon  Dinner: beef stew with rice  Snack: potato chip  Drink: regular soda, tea with sugar, juice    Allergies   Allergen Reactions    Janumet [Sitagliptin Phos-Metformin] Diarrhea     Janumet caused diarrhea and indigestion    Oxycodone-Acetaminophen Other    Acetaminophen GI Upset    Oxycodone GI Upset       Current DM pharmacotherapy:   - metformin XR 1000mg PO QAM  - Trulicity 0.75mg subcutaneous    Adherence: {Adherence:53923}  Adverse Effects: ***    Secondary prevention:  - Statin? Yes  - Atorvastatin 40mg PO daily   - ACE-I/ARB? Yes   - Lisinopril 5mg PO daily   - Aspirin? No    Current monitoring regimen:     Patient do not check BG at home.     Any episodes of hypoglycemia? No  Hypoglycemia awareness? Yes    Pertinent PMH review:    - PMH of Pancreatitis: No  - PMH/FH of Medullary  "Thyroid Cancer: No  - PMH/FH of Multiple Endocrine Neoplasia (MEN) Type II: No  - PMH of Retinopathy: No  - PMH of Urinary Tract Infections/Yeast Infections: Yes     Last optometry exam: Per patient last eye exam was last  and no diabetic retinopathy noticed  Most recent visit in Podiatry:   Most recent influenza vaccine: none on file  Most recent pneumococcal vaccine: 24 (PCV20)    Patient goals:  - Fasting B - 130 mg/dL  - Postprandial BG: less than 180 mg/dL  - A1c less than 7%    Objective   Lab Review  Lab Results   Component Value Date    BILITOT 0.3 10/31/2024    CALCIUM 8.6 10/31/2024    CO2 23 10/31/2024     10/31/2024    CREATININE 0.74 10/31/2024    GLUCOSE 332 (H) 10/31/2024    ALKPHOS 105 10/31/2024    K 4.1 10/31/2024    PROT 6.6 10/31/2024     10/31/2024    AST 11 10/31/2024    ALT 13 10/31/2024    BUN 15 10/31/2024    ANIONGAP 16 10/31/2024    PHOS 3.5 2023    ALBUMIN 3.7 10/31/2024    GFRF >90 2023     Lab Results   Component Value Date    TRIG 216 (H) 2025    CHOL 207 (H) 2025    LDLCALC 121 (H) 2025    HDL 43.0 2025     Lab Results   Component Value Date    HGBA1C 11.2 (H) 2025     Lab Results   Component Value Date    EGFR 87 10/31/2024     No components found for: \"UACR\"  BP Readings from Last 3 Encounters:   25 126/78   25 126/83   25 119/74     BMI Readings from Last 1 Encounters:   25 35.30 kg/m²     The 10-year ASCVD risk score (Perez SANDERS, et al., 2019) is: 26.8%    Values used to calculate the score:      Age: 69 years      Sex: Female      Is Non- : Yes      Diabetic: Yes      Tobacco smoker: No      Systolic Blood Pressure: 126 mmHg      Is BP treated: Yes      HDL Cholesterol: 43 mg/dL      Total Cholesterol: 207 mg/dL    Assessment/Plan   Problem List Items Addressed This Visit    None      Type 2 diabetes mellitus is not at goal.   Current A1C: 11.2% (25)  Goal " A1C: <7%    Discussion: ***  Plan: ***  Follow-up: *** weeks; ***  Future considerations: ***    Siva Matthews RPh, PharmD  [employer] PGY-1 Pharmacy Resident  [facility]     increasing Trulicity from 0.75mg subcutaneous weekly to 1.5mg subcutaneous weekly if additional glycemic controlled is needed. Consider SGLT2 inhibitor if patient cannot tolerate GLP-1 agonist.     Siva Matthews RPh, PharmD  [employer] PGY-1 Pharmacy Resident  [facility]      Addendum:     I have reviewed the resident's progress note associated with the patient's visit, and I agree with the resident's assessment and plan.      Jeffery Olguin, PharmD, CP, BCACP  Clinical Pharmacy Specialist  [facility]

## 2025-02-06 NOTE — PROGRESS NOTES
I reviewed the resident/fellow's documentation and discussed the patient with the resident/fellow. I agree with the resident/fellow's medical decision making as documented in the note.    Joey Morse MD

## 2025-02-07 NOTE — PROGRESS NOTES
"AUDIOLOGY ADULT AUDIOMETRIC EVALUATION      Name:  Jeannine Moreno   :  1955  Age:  69 y.o.  Date of Evaluation:  2/10/2025    Time:     IMPRESSIONS     Normal to mild to moderate sensorineural hearing loss in both ears.  Word recognition in quiet is excellent in both ears.  Tympanometry indicates normal middle ear pressure and reduced eardrum compliance in the right ear, and negative middle ear pressure and normal eardrum mobility in the left ear.     Amplification needs:  Patient was informed of hearing aid candidacy. She reports she is not motivated to pursue hearing aids at this time. She was counseled that she would meet criteria for hearing aid coverage through [employer]. She would like to monitor hearing levels annually and re-assess hearing aids next year.    RECOMMENDATIONS     Continue medical follow up with primary care provider and/or Ears Nose and Throat (ENT) provider as recommended.  Return for audiologic evaluation annually to monitor hearing sensitivity and assess middle ear status or sooner should concerns arise. The audiology department can be reached at [phone] to schedule an appointment.   Consider amplification. Check insurance benefit for hearing aid benefits and in-network providers. Patient was provided with a copy of today's audiogram.    HISTORY     Reason for visit:  Ms. Moreno is seen today for an initial audiologic evaluation in conjunction with an evaluation with Ashley Leon APRN.CNP. She was seen in the ED on 10/31/24 due to headache. She saw her PCP on 24 and was reporting right ear pain. On exam, impacted cerumen was noted in the right ear. History obtained from patient report and chart review.     EVALUATION     See scanned audiogram in \"Media\"     TEST RESULTS     Otoscopic Evaluation:  Right Ear: Ear canal clear, tympanic membrane visualized.  Left Ear: Ear canal clear, tympanic membrane visualized.    Tympanometry (226 Hz):  Right Ear: Type " As, normal middle ear pressure and reduced tympanic membrane compliance.   Left Ear: Type C, negative middle ear pressure (-218 daPa) and normal eardrum mobility.     Acoustic Reflexes:   Right Ear: (Ipsilateral) Responses present at 500-4000 Hz.  Left Ear: Did not test.    Distortion Product Otoacoustic Emissions:  Right Ear: Did not test.  Left Ear:  Did not test.  Present OAEs suggest normal or near cochlear outer hair cell function for corresponding frequency region(s). Absent OAEs with normal middle ear function can be consistent with some degree of hearing loss. Assessment of cochlear outer hair cell function may be impacted by outer or middle ear function.    Test technique:  Pure Tone Audiometry via insert earphones  Reliability: Good    Pure Tone Audiometry:    Right Ear: Normal sloping to moderate sensorineural hearing loss 125-8000 Hz.  Left Ear: Normal sloping to moderate sensorineural hearing loss 125-8000 Hz.    Speech Audiometry:   Right Ear:  Speech Reception Threshold (SRT) was obtained at 35 dB HL. Word Recognition scores were excellent (92%) in quiet when words were presented at 75 dB HL. These results are based on [facility] Auditory Test No.6 (NU-6) (N=25).   Left Ear:  Speech Reception Threshold (SRT) was obtained at 40 dB HL. Word Recognition scores were excellent (96%) in quiet when words were presented at 80 dB HL. These results are based on [facility] Auditory Test No.6 (NU-6) (N=25).     Comparison of today's results with previous test results: No previous results available.         PATIENT EDUCATION     Discussed results and recommendations with Ms. Moreno. Questions were addressed and the patient was encouraged to contact our department at [phone] should concerns arise.       Yanna Acosta, CCC-A  Clinical Audiologist    Degree of   Hearing Sensitivity dB Range   Within Normal Limits (WNL) 0 - 20   Slight 25   Mild 26 - 40   Moderate 41 - 55    Moderately-Severe 56 - 70   Severe 71 - 90   Profound 91 +     Key   CHL Conductive Hearing Loss   ECV Ear Canal Volume   HA Hearing Aid   NIHL Noise-Induced Hearing Loss   PTA Pure Tone Average   SNHL Sensorineural Hearing Loss   TM Tympanic Membrane   WNL Within Normal Limits

## 2025-02-10 ENCOUNTER — CLINICAL SUPPORT (OUTPATIENT)
Dept: AUDIOLOGY | Facility: HOSPITAL | Age: 70
End: 2025-02-10
Payer: COMMERCIAL

## 2025-02-10 ENCOUNTER — OFFICE VISIT (OUTPATIENT)
Dept: OTOLARYNGOLOGY | Facility: HOSPITAL | Age: 70
End: 2025-02-10
Payer: COMMERCIAL

## 2025-02-10 VITALS — WEIGHT: 197 LBS | BODY MASS INDEX: 36.25 KG/M2 | HEIGHT: 62 IN | TEMPERATURE: 98.6 F

## 2025-02-10 DIAGNOSIS — H74.8X1 TYPE A-S TYMPANOGRAM, RIGHT: ICD-10-CM

## 2025-02-10 DIAGNOSIS — H92.01 ACUTE EAR PAIN, RIGHT: ICD-10-CM

## 2025-02-10 DIAGNOSIS — H90.3 SENSORINEURAL HEARING LOSS (SNHL) OF BOTH EARS: Primary | ICD-10-CM

## 2025-02-10 DIAGNOSIS — H69.92 TYPE C TYMPANOGRAM OF LEFT EAR: ICD-10-CM

## 2025-02-10 DIAGNOSIS — H61.21 EXCESSIVE CERUMEN IN EAR CANAL, RIGHT: Primary | ICD-10-CM

## 2025-02-10 PROCEDURE — 3046F HEMOGLOBIN A1C LEVEL >9.0%: CPT | Performed by: NURSE PRACTITIONER

## 2025-02-10 PROCEDURE — 3049F LDL-C 100-129 MG/DL: CPT | Performed by: NURSE PRACTITIONER

## 2025-02-10 PROCEDURE — 92557 COMPREHENSIVE HEARING TEST: CPT | Performed by: AUDIOLOGIST

## 2025-02-10 PROCEDURE — 3008F BODY MASS INDEX DOCD: CPT | Performed by: NURSE PRACTITIONER

## 2025-02-10 PROCEDURE — 1160F RVW MEDS BY RX/DR IN RCRD: CPT | Performed by: NURSE PRACTITIONER

## 2025-02-10 PROCEDURE — 69210 REMOVE IMPACTED EAR WAX UNI: CPT | Performed by: NURSE PRACTITIONER

## 2025-02-10 PROCEDURE — 3061F NEG MICROALBUMINURIA REV: CPT | Performed by: NURSE PRACTITIONER

## 2025-02-10 PROCEDURE — 69200 CLEAR OUTER EAR CANAL: CPT | Performed by: NURSE PRACTITIONER

## 2025-02-10 PROCEDURE — 99203 OFFICE O/P NEW LOW 30 MIN: CPT | Performed by: NURSE PRACTITIONER

## 2025-02-10 PROCEDURE — 4010F ACE/ARB THERAPY RXD/TAKEN: CPT | Performed by: NURSE PRACTITIONER

## 2025-02-10 PROCEDURE — 92550 TYMPANOMETRY & REFLEX THRESH: CPT | Mod: 52 | Performed by: AUDIOLOGIST

## 2025-02-10 PROCEDURE — 99213 OFFICE O/P EST LOW 20 MIN: CPT | Mod: 25 | Performed by: NURSE PRACTITIONER

## 2025-02-10 PROCEDURE — 1159F MED LIST DOCD IN RCRD: CPT | Performed by: NURSE PRACTITIONER

## 2025-02-10 PROCEDURE — 1036F TOBACCO NON-USER: CPT | Performed by: NURSE PRACTITIONER

## 2025-02-10 NOTE — PROGRESS NOTES
Ear Nose & Throat Clinic Note          2/10/25  Jeannine Moreno is a 69 y.o. year old female patient with hx of right ear pain referred for Referral to ENT.      : 1955    HPI:  Jeannine Moreno presents today with complaints of right ear pain.  Patient states approximately 2 to 3 months ago she was having right ear pain; she was seen by her primary care doctor who gave her eardrops.  After using the drops for a few days patient states the pain went away and she had no further complaints, and today she is not experiencing any pain.  Prior to today's visit she did undergo an audiology appointment where she had audiogram done.  The audiogram showed bilateral sensorineural hearing loss; it was discussed that she is a candidate for bilateral hearing aids.  However at this time she does not wish to pursue hearing aids.  It was noted on exam that she had some mild cerumen impaction in the right ear.      ROS:  Unless mentioned in the HPI, all other systems have been reviewed and ar negative.    Constitutional:   General appearance: Healthy appearing, well-nourished, well groomed.  No acute distress.  Communication: Normal communication without aids or , normal voice quality.  No hoarseness, stridor or stertor.    Psychiatric: Oriented to person, place and time.  Normal mood and affect.    Neurologic: Cranial nerves II-XII grossly intact and symmetric bilaterally.    Head and Face:  Head: Atraumatic with no masses, lesions or scarring.  Face: Normal symmetry, no paralysis, synkinesis or facial tic.  No scars or deformities.  Sinuses: Palpation of the face revealed no sinus tenderness  Salivary glands: No tenderness of the parotid gland, no parotid masses.  No tenderness of the submandibular glands, no submandibular gland masses.  TMJ: Normal, no clicking or pain with palpation.    Eyes: conjunctiva not edematous or erythematous    Ears: External inspection of ears with no deformity, scars or masses.   Otoscopic examination: Auditory canals with normal appearance, moderate cerumen in right ear, no erythema or edema bilaterally.  Tympanic membranes intact without middle ear effusion.  Assessment of hearing with normal clinical speech reception to voice.      Nose: External inspection of nose: No nasal lesions, lacerations or scars.  Septum midline.  No inferior turbinate hypertrophy.  Patent with good air entry bilaterally.    Oral Cavity/Mouth: No masses, lesions or ulcers along the lips, gingival or buccal mucosa.  Floor of the mouth is soft without edema or masses.  Teeth in fair condition.  No masses, lesions or ulcers along the tongue.  Oral cavity and oropharynx mucosa moist.  Hard and soft palate intact and symmetric with no masses or lesions.  Posterior oropharynx patent.  Palate, posterior pharyngeal walls and tonsils normal, symmetric with no masses, lesions or ulcers.      Neck: Normal appearing, symmetric, trachea midline.  No crepitus.  Thyroid: Symmetrical, no enlargement, no tenderness, no nodules.    Lymphatic: No palpable cervical lymphadenopathy, no submandibular lymphadenopathy, no supraclavicular lymphadenopathy.    Cardiovascular: Examination of peripheral vascular system shows no clubbing or cyanosis.    Respiratory: No respiratory distress increased work of breathing.  Inspection of the chest with symmetric chest expansion and normal respiratory effort.    Skin: No rashes in the head or neck        Assessment:   69 year old female with a hx of right ear pain that has resolved prior to this visit. The patient underwent an audiogram and she is a candidate for hearing aids, but at this time she is not ready to pursue at this time.      Recommendations:   -To clean the ears, wash the external ear with a cloth, but do not insert anything into the ear canal.  -Most cases of ear wax blockage respond to home treatments used to soften wax.  -Debrox OTC as needed  -You may try placing a few drops of  mineral oil or baby oil once a week to help keep the wax soft. We do NOT recommend placing ANYTHING in the ear canal.  -Doing so may cause wax to be pushed back into the ear canal and stuck. It also risks injury to the ear canal and ear drum.   -We recommend avoiding using cotton tipped applicators, tissues, paper, or any rigid object in the ear canal.   Some people require regular cleanings every year or so to keep the ear canals open.  -Follow up in 6 months to 1 year or earlier if needed    Ashley Leon, APRN-CNP

## 2025-02-10 NOTE — PATIENT INSTRUCTIONS
Jenny Preciadoon,  Welcome to the clinic of Ashley Leon CNP. We are here to assist you through your ENT care at [facility].    My office number is [phone]. This number is the most direct way to communicate with the office. Lianet is my  and she answers the office phone from 8am-4pm Mon-Fri. She can help you with many general questions and information. Questions that she cannot answer will be directed appropriately to me. You may need to leave a message. In this case, someone from the team will call you back.    Sometimes other team members will also be involved in your care. This may include dieticians, social workers, speech therapists, medical oncologist or radiation oncologists. I will provide these referrals as needed. Please let me know if you would like to request a specific referral.    I look forward to working with you to meet your healthcare goals.     Recommendations:   -To clean the ears, wash the external ear with a cloth, but do not insert anything into the ear canal.  -Most cases of ear wax blockage respond to home treatments used to soften wax.  -Debrox OTC as needed  -You may try placing a few drops of mineral oil or baby oil once a week to help keep the wax soft. We do NOT recommend placing ANYTHING in the ear canal.  -Doing so may cause wax to be pushed back into the ear canal and stuck. It also risks injury to the ear canal and ear drum.   -We recommend avoiding using cotton tipped applicators, tissues, paper, or any rigid object in the ear canal.   Some people require regular cleanings every year or so to keep the ear canals open.  -Follow up in 6 months to 1 year or earlier if needed

## 2025-02-19 ENCOUNTER — APPOINTMENT (OUTPATIENT)
Dept: PHARMACY | Facility: HOSPITAL | Age: 70
End: 2025-02-19
Payer: COMMERCIAL

## 2025-02-19 DIAGNOSIS — E11.9 TYPE 2 DIABETES MELLITUS WITHOUT COMPLICATION, WITHOUT LONG-TERM CURRENT USE OF INSULIN (MULTI): ICD-10-CM

## 2025-02-19 NOTE — ASSESSMENT & PLAN NOTE
Discussion: Patient's T2DM is currently uncontrolled based on most recent A1C above the goal(11.2%, 1/17/25). Patient is not currently checking BG at home. At today's pharmacy appointment, patient reports taking metformin 1000mg PO BID and Trulicity 0.75mg subcutaneous weekly. Patient reports compliance and denies having ADRs to the meds.  Based on clinical evidence, metformin maximum dose can have 1% reduction in A1C and Trulicity 1.5mg subcutaneous weekly has 1.5% reduction in A1C, given patient's most recent A1C of 11.2%(1/17/25) while patient had been on metformin 1000mg PO BID and Trulicity 0.75mg subcutaneous weekly for at least one month, patient may benefit from higher dose of Trulicity for glycemic control. During the visit, after discussion about increasing her Trulicity dose, patient states she would like to increase Trulicity to next available dose after she finishes the Trulicity 0.75mg supply she just received last week.    Plan:   Continue metformin 1000mg PO BID  Continue Trulicity 0.75mg subcutaneous weekly   Pre-Procedure Text: The treatment areas were cleaned and prepped in the usual fashion.

## 2025-02-19 NOTE — PROGRESS NOTES
Primary Care Clinical Pharmacist Follow-Up Visit   Patient ID: Jeannine Moreno is a 69 y.o. female who presents for No chief complaint on file..    Patient was referred to clinical pharmacy for management of type 2 diabetes. Patient gives consent to have visit conducted via telehealth. Based on clinical evidence, metformin maximum dose can have 1% reduction in A1C and Trulicity 1.5mg subcutaneous weekly has 1.5% reduction in A1C, given patient's most recent A1C of 11.2%(1/17/25) while patient had been on metformin 1000mg PO BID and Trulicity 0.75mg subcutaneous weekly for at least one month, patient may benefit from higher dose of Trulicity for glycemic control. During the last pharmacy appointment, after discussion about increasing her Trulicity dose, patient stated she would like to increase Trulicity to next available dose after she finishes the Trulicity 0.75mg supply she just received the week prior to the visit. Patient was instructed to continue metformin 1000mg PO BID and Trulicity 0.75mg subcutaneous weekly.  At today's pharmacy appointment, patient reports     Date of Follow-Up Visit: 3/19/25  Date of Initial Visit: 12/16/24   Referring provider: Katelin Banks MD  Most recent PCP appointment:  2/3/25   Next appointment with PCP: 5/2/25     Subjective   HPI    Diet: 2 meals/day  Breakfast: cereal, oat meal, grullon  Dinner: beef stew with rice  Snack: potato chip  Drink: regular soda, tea with sugar, juice    Exercise: None     Allergies   Allergen Reactions    Janumet [Sitagliptin Phos-Metformin] Diarrhea     Janumet caused diarrhea and indigestion    Oxycodone-Acetaminophen Other    Acetaminophen GI Upset    Oxycodone GI Upset       Current DM pharmacotherapy:   - metformin 1000mg PO BID  - Trulicity 0.75mg subcutaneous weekly    Adherence: {Adherence:78265}  Adverse Effects: ***    Secondary prevention:  - Statin? Yes  - Atorvastatin 40mg PO daily   - ACE-I/ARB? Yes              - Lisinopril 5mg PO  "daily   - Aspirin? No    Current monitoring regimen:   Patient do not check BG at home.     Any episodes of hypoglycemia? {yes,no:43420}  Hypoglycemia awareness? Yes    Pertinent PMH review:  - PMH of Pancreatitis: No  - PMH/FH of Medullary Thyroid Cancer: No  - PMH/FH of Multiple Endocrine Neoplasia (MEN) Type II: No  - PMH of Retinopathy: No  - PMH of Urinary Tract Infections/Yeast Infections: Yes    Last optometry exam: Per patient last eye exam was last  and no diabetic retinopathy noticed  Most recent visit in Podiatry:   Most recent influenza vaccine: none on file  Most recent pneumococcal vaccine: 24 (PCV20)    Patient goals:  - Fasting B - 130 mg/dL  - Postprandial BG: less than 180 mg/dL  - A1c less than 7%    Objective   Lab Review  Lab Results   Component Value Date    BILITOT 0.3 10/31/2024    CALCIUM 8.6 10/31/2024    CO2 23 10/31/2024     10/31/2024    CREATININE 0.74 10/31/2024    GLUCOSE 332 (H) 10/31/2024    ALKPHOS 105 10/31/2024    K 4.1 10/31/2024    PROT 6.6 10/31/2024     10/31/2024    AST 11 10/31/2024    ALT 13 10/31/2024    BUN 15 10/31/2024    ANIONGAP 16 10/31/2024    PHOS 3.5 2023    ALBUMIN 3.7 10/31/2024    GFRF >90 2023     Lab Results   Component Value Date    TRIG 216 (H) 2025    CHOL 207 (H) 2025    LDLCALC 121 (H) 2025    HDL 43.0 2025     Lab Results   Component Value Date    HGBA1C 11.2 (H) 2025     Lab Results   Component Value Date    EGFR 87 10/31/2024     No components found for: \"UACR\"  BP Readings from Last 3 Encounters:   25 126/78   25 126/83   25 119/74     BMI Readings from Last 1 Encounters:   02/10/25 36.03 kg/m²     The 10-year ASCVD risk score (Perez SANDERS, et al., 2019) is: 26.8%    Values used to calculate the score:      Age: 69 years      Sex: Female      Is Non- : Yes      Diabetic: Yes      Tobacco smoker: No      Systolic Blood Pressure: 126 mmHg   "    Is BP treated: Yes      HDL Cholesterol: 43 mg/dL      Total Cholesterol: 207 mg/dL    Assessment/Plan   Problem List Items Addressed This Visit    None      Type 2 diabetes mellitus is not at goal.   Current A1C: 11.2% (1/17/25)  Goal A1C: <7%    Discussion: ***  Plan: ***  Follow-up: *** weeks; ***  Future considerations: ***   PAP: expires *** (deleted if not enrolled)    Siva Matthews RPh, PharmD  [employer] PGY-1 Pharmacy Resident  [facility]     associated with the patient's visit, and I agree with the resident's assessment and plan.      Jeffery Olguin, PharmD, BCACP  Clinical Pharmacy Specialist  [facility]

## 2025-03-19 ENCOUNTER — APPOINTMENT (OUTPATIENT)
Dept: PHARMACY | Facility: HOSPITAL | Age: 70
End: 2025-03-19
Payer: COMMERCIAL

## 2025-03-19 DIAGNOSIS — E11.9 TYPE 2 DIABETES MELLITUS WITHOUT COMPLICATION, WITHOUT LONG-TERM CURRENT USE OF INSULIN (MULTI): ICD-10-CM

## 2025-03-19 RX ORDER — DULAGLUTIDE 1.5 MG/.5ML
1.5 INJECTION, SOLUTION SUBCUTANEOUS WEEKLY
Qty: 2 ML | Refills: 11 | Status: SHIPPED | OUTPATIENT
Start: 2025-03-19

## 2025-03-19 NOTE — ASSESSMENT & PLAN NOTE
Discussion: Patient's T2DM is currently uncontrolled based on most recent A1C above the goal(11.2%, 1/17/25). Patient is not currently checking BG at home. At today's pharmacy appointment, patient reports taking metformin 1000mg PO BID and Trulicity 0.75mg subcutaneous weekly. Patient reports compliance and denies having ADRs to the meds. Patient states she takes her Trulicity on Friday and usually receive her medication from the pharmacy on Fridays.  Plan:   Increase Trulicity from 0.75mg subcutaneous weekly to 1.5mg subcutaneous weekly  Prescription electronically sent to patient's preferred pharmacy  Instructed patient to start taking Trulicity 1.5mg injection on 3/28/25  Counseled patient on possible side effects of Trulicity and what to do if they occur  Continue metformin 1000mg PO BID  Instructed patient to go to lab to have A1C check the week prior to next visit with clinical pharmacist

## 2025-03-19 NOTE — PROGRESS NOTES
Primary Care Clinical Pharmacist Follow-Up Visit   Patient ID: Jeannine Moreno is a 69 y.o. female who presents for No chief complaint on file..    Patient was referred to clinical pharmacy for management of type 2 diabetes. Patient gives consent to have visit conducted via telehealth. At last pharmacy appointment, patient was instructed to increase Trulicity from 0.75mg subcutaneous weekly to 1.5mg subcutaneous weekly and continue taking metformin 1000mg PO BID.  At today's pharmacy appointment, patient reports     Date of Follow-Up Visit: 3/19/25   Date of Initial Visit: 12/16/24   Referring provider: Katelin Banks MD  Most recent PCP appointment: 2/3/25   Next appointment with PCP: 5/2/25     Subjective   HPI    Diet: 2 meals/day  Breakfast: cereal, oat meal, grullon  Dinner: beef stew with rice  Snack: potato chip  Drink: regular soda, tea with sugar, juice     Exercise: None     Allergies   Allergen Reactions    Janumet [Sitagliptin Phos-Metformin] Diarrhea     Janumet caused diarrhea and indigestion    Oxycodone-Acetaminophen Other    Acetaminophen GI Upset    Oxycodone GI Upset       Current DM pharmacotherapy:   - Trulicity 1.5mg subcutaneous weekly  - - metformin 1000mg PO BID     Adherence: {Adherence:04174}  Adverse Effects: ***    Secondary prevention:  - Statin? Yes   - Atorvastatin 40mg PO daily   - ACE-I/ARB? Yes   - Lisinopril 5mg PO daily   - Aspirin? No    Current monitoring regimen:     Patient do not check BG at home.     Any episodes of hypoglycemia? {yes,no:42929}  Hypoglycemia awareness? Yes    Pertinent PMH review:  - PMH of Pancreatitis: No  - PMH/FH of Medullary Thyroid Cancer: No  - PMH/FH of Multiple Endocrine Neoplasia (MEN) Type II: No  - PMH of Retinopathy: No  - PMH of Urinary Tract Infections/Yeast Infections: Yes    Last optometry exam: Per patient last eye exam was last June and no diabetic retinopathy noticed  Most recent visit in Podiatry: 12/24  Most recent influenza  "vaccine: none on file  Most recent pneumococcal vaccine: 24 (PCV20)    Patient goals:  - Fasting B - 130 mg/dL  - Postprandial BG: less than 180 mg/dL  - A1c less than 7%    Objective   Lab Review  Lab Results   Component Value Date    BILITOT 0.3 10/31/2024    CALCIUM 8.6 10/31/2024    CO2 23 10/31/2024     10/31/2024    CREATININE 0.74 10/31/2024    GLUCOSE 332 (H) 10/31/2024    ALKPHOS 105 10/31/2024    K 4.1 10/31/2024    PROT 6.6 10/31/2024     10/31/2024    AST 11 10/31/2024    ALT 13 10/31/2024    BUN 15 10/31/2024    ANIONGAP 16 10/31/2024    PHOS 3.5 2023    ALBUMIN 3.7 10/31/2024    GFRF >90 2023     Lab Results   Component Value Date    TRIG 216 (H) 2025    CHOL 207 (H) 2025    LDLCALC 121 (H) 2025    HDL 43.0 2025     Lab Results   Component Value Date    HGBA1C 11.2 (H) 2025     Lab Results   Component Value Date    EGFR 87 10/31/2024     No components found for: \"UACR\"  BP Readings from Last 3 Encounters:   25 126/78   25 126/83   25 119/74     BMI Readings from Last 1 Encounters:   02/10/25 36.03 kg/m²     The 10-year ASCVD risk score (Perez SANDERS, et al., 2019) is: 26.7%    Values used to calculate the score:      Age: 69 years      Sex: Female      Is Non- : Yes      Diabetic: Yes      Tobacco smoker: No      Systolic Blood Pressure: 126 mmHg      Is BP treated: No      HDL Cholesterol: 43 mg/dL      Total Cholesterol: 207 mg/dL    Assessment/Plan   Problem List Items Addressed This Visit    None      Type 2 diabetes mellitus is {at goal/not at goal:05431}.   Current A1C: ***% (***)  Goal A1C: <7%    Discussion: ***  Plan: ***  Follow-up: *** weeks; ***  Future considerations: ***   PAP: expires *** (deleted if not enrolled)    Siva Matthews RPh, PharmD  [employer] PGY-1 Pharmacy Resident  [facility]    " metformin 1000mg PO BID and Trulicity 1.5mg subcutaneous weekly. Patient reported she completed 4th dose of Trulicity 1.5mg injection on 4/18/25. Patient reports compliance and denies having ADRs to the meds. Patient's most recent A1C result is reflective to current use of metformin 1000mg PO BID and Trulicity 1.5mg subcutaneous weekly.  Plan:   Increase Trulicity from 1.5mg subcutaneous weekly to 3mg subcutaneous weekly  Prescription electronically sent to patient's preferred pharmacy  Counseled patient on possible side effects of Trulicity and what to do if they occur  Continue metformin 1000mg PO BID         Relevant Medications    dulaglutide (Trulicity) 3 mg/0.5 mL injection    Other Relevant Orders    Referral to Clinical Pharmacy       Type 2 diabetes mellitus is not at goal.   Current A1C: 8.1% (4/16/25)  Goal A1C: <7%    Follow-up: 4 weeks; 5/21 at 9:30 AM via telehealth  Future considerations: Consider increasing Trulicity to 4.5mg subcutaneous weekly if patient can tolerate and after completing 4 doses of Trulicity 3mg injection if additional glycemic controlled is needed     Siva Matthews RPh, PharmD  [employer] PGY-1 Pharmacy Resident  [facility]      Addendum:     I have reviewed the resident's progress note associated with the patient's visit, and I agree with the resident's assessment and plan.      Jeffery Olguin, PharmD, BCACP  Clinical Pharmacy Specialist  [facility]

## 2025-04-16 DIAGNOSIS — E55.9 MILD VITAMIN D DEFICIENCY: ICD-10-CM

## 2025-04-16 RX ORDER — ACETAMINOPHEN 500 MG
50 TABLET ORAL DAILY
Qty: 30 CAPSULE | Refills: 11 | Status: SHIPPED | OUTPATIENT
Start: 2025-04-16

## 2025-04-17 LAB
ALBUMIN/CREAT UR: 13 MG/G CREAT
CHOLEST SERPL-MCNC: 167 MG/DL
CHOLEST/HDLC SERPL: 3.6 (CALC)
CREAT UR-MCNC: 140 MG/DL (ref 20–275)
EST. AVERAGE GLUCOSE BLD GHB EST-MCNC: 186 MG/DL
EST. AVERAGE GLUCOSE BLD GHB EST-MCNC: 189 MG/DL
EST. AVERAGE GLUCOSE BLD GHB EST-SCNC: 10.3 MMOL/L
EST. AVERAGE GLUCOSE BLD GHB EST-SCNC: 10.4 MMOL/L
HBA1C MFR BLD: 8.1 %
HBA1C MFR BLD: 8.2 %
HDLC SERPL-MCNC: 46 MG/DL
LDLC SERPL CALC-MCNC: 94 MG/DL (CALC)
MICROALBUMIN UR-MCNC: 1.8 MG/DL
NONHDLC SERPL-MCNC: 121 MG/DL (CALC)
TRIGL SERPL-MCNC: 170 MG/DL
VIT B12 SERPL-MCNC: 328 PG/ML (ref 200–1100)

## 2025-04-23 ENCOUNTER — APPOINTMENT (OUTPATIENT)
Dept: PHARMACY | Facility: HOSPITAL | Age: 70
End: 2025-04-23
Payer: COMMERCIAL

## 2025-04-23 DIAGNOSIS — E11.9 TYPE 2 DIABETES MELLITUS WITHOUT COMPLICATION, WITHOUT LONG-TERM CURRENT USE OF INSULIN: ICD-10-CM

## 2025-04-23 NOTE — ASSESSMENT & PLAN NOTE
Discussion: Patient's T2DM is currently uncontrolled based on most recent A1C above the goal(8.1%, 4/16/25). Patient is not currently checking BG at home. At today's pharmacy appointment, patient reports taking metformin 1000mg PO BID and Trulicity 1.5mg subcutaneous weekly. Patient reported she completed 4th dose of Trulicity 1.5mg injection on 4/18/25. Patient reports compliance and denies having ADRs to the meds. Patient's most recent A1C result is reflective to current use of metformin 1000mg PO BID and Trulicity 1.5mg subcutaneous weekly.  Plan:   Increase Trulicity from 1.5mg subcutaneous weekly to 3mg subcutaneous weekly  Prescription electronically sent to patient's preferred pharmacy  Counseled patient on possible side effects of Trulicity and what to do if they occur  Continue metformin 1000mg PO BID

## 2025-04-23 NOTE — PROGRESS NOTES
Primary Care Clinical Pharmacist Follow-Up Visit   Patient ID: Jeannine Moreno is a 69 y.o. female who presents for No chief complaint on file..    Patient was referred to clinical pharmacy for management of type 2 diabetes. Patient gives consent to have visit conducted via telehealth. At last pharmacy appointment, patient was instructed to increase Trulicity from 1.5mg subcutaneous weekly to 3mg subcutaneous weekly and continue taking metformin 1000mg PO BID.  At today's pharmacy appointment, patient reports taking     ASK if get rid of the wrong box!!    Date of Follow-Up Visit: 5/21/25   Date of Initial Visit: 12/16/24   Referring provider: Katelin Banks MD  Most recent PCP appointment: 5/2/25  Next appointment with PCP: ***    Subjective   HPI    Diet: 2 meals/day  Breakfast: cereal, oat meal, grullon  Dinner: beef stew with rice  Snack: potato chip  Drink: regular soda, tea with sugar, juice     Exercise: None     Allergies   Allergen Reactions    Janumet [Sitagliptin Phos-Metformin] Diarrhea     Janumet caused diarrhea and indigestion    Oxycodone-Acetaminophen Other    Acetaminophen GI Upset    Oxycodone GI Upset       Current DM pharmacotherapy:   - Trulicity 3mg subcutaneous weekly  - metformin 1000mg PO BID     Adherence: {Adherence:35540}  Adverse Effects: ***    Secondary prevention:  - Statin? Yes   - Atorvastatin 40mg PO daily   - ACE-I/ARB? Yes   - Lisinopril 5mg PO daily   - Aspirin? No    Current monitoring regimen:     Patient do not check BG at home.     Any episodes of hypoglycemia? No***  Hypoglycemia awareness? Yes***    Pertinent PMH review:  - PMH of Pancreatitis: No  - PMH/FH of Medullary Thyroid Cancer: No  - PMH/FH of Multiple Endocrine Neoplasia (MEN) Type II: No  - PMH of Retinopathy: No  - PMH of Urinary Tract Infections/Yeast Infections: Yes    Last optometry exam: Per patient last eye exam was last June and no diabetic retinopathy noticed  Most recent visit in Podiatry:  "  Most recent influenza vaccine: none on file  Most recent pneumococcal vaccine: 24 (PCV20)    Patient goals:  - Fasting B - 130 mg/dL  - Postprandial BG: less than 180 mg/dL  - A1c less than 7%    Objective   Lab Review  Lab Results   Component Value Date    BILITOT 0.3 10/31/2024    CALCIUM 8.6 10/31/2024    CO2 23 10/31/2024     10/31/2024    CREATININE 0.74 10/31/2024    GLUCOSE 332 (H) 10/31/2024    ALKPHOS 105 10/31/2024    K 4.1 10/31/2024    PROT 6.6 10/31/2024     10/31/2024    AST 11 10/31/2024    ALT 13 10/31/2024    BUN 15 10/31/2024    ANIONGAP 16 10/31/2024    PHOS 3.5 2023    ALBUMIN 3.7 10/31/2024    GFRF >90 2023     Lab Results   Component Value Date    TRIG 170 (H) 2025    CHOL 167 2025    LDLCALC 94 2025    HDL 46 (L) 2025     Lab Results   Component Value Date    HGBA1C 8.2 (H) 2025     Lab Results   Component Value Date    EGFR 87 10/31/2024     No components found for: \"UACR\"  BP Readings from Last 3 Encounters:   25 126/78   25 126/83   25 119/74     BMI Readings from Last 1 Encounters:   02/10/25 36.03 kg/m²     The 10-year ASCVD risk score (Perez SANDERS, et al., 2019) is: 22.6%    Values used to calculate the score:      Age: 69 years      Sex: Female      Is Non- : Yes      Diabetic: Yes      Tobacco smoker: No      Systolic Blood Pressure: 126 mmHg      Is BP treated: No      HDL Cholesterol: 46 mg/dL      Total Cholesterol: 167 mg/dL    Assessment/Plan   Problem List Items Addressed This Visit    None        Type 2 diabetes mellitus is not at goal.   Current A1C: 8.1% (25)  Goal A1C: <7%    Follow-up: *** weeks; *** via telehealth  Future considerations: ***    Siva Matthews RPh, PharmD  [employer] PGY-1 Pharmacy Resident  [facility]    "

## 2025-05-02 ENCOUNTER — OFFICE VISIT (OUTPATIENT)
Facility: HOSPITAL | Age: 70
End: 2025-05-02
Payer: COMMERCIAL

## 2025-05-02 VITALS
DIASTOLIC BLOOD PRESSURE: 77 MMHG | TEMPERATURE: 96.8 F | BODY MASS INDEX: 35.22 KG/M2 | OXYGEN SATURATION: 91 % | WEIGHT: 191.4 LBS | HEART RATE: 79 BPM | SYSTOLIC BLOOD PRESSURE: 119 MMHG | HEIGHT: 62 IN

## 2025-05-02 DIAGNOSIS — E78.5 HYPERLIPIDEMIA, UNSPECIFIED HYPERLIPIDEMIA TYPE: ICD-10-CM

## 2025-05-02 DIAGNOSIS — I10 HYPERTENSION, ESSENTIAL: ICD-10-CM

## 2025-05-02 DIAGNOSIS — H54.3 BLINDNESS OF BOTH EYES: Primary | ICD-10-CM

## 2025-05-02 PROCEDURE — 99214 OFFICE O/P EST MOD 30 MIN: CPT | Mod: GC

## 2025-05-02 RX ORDER — ATORVASTATIN CALCIUM 40 MG/1
40 TABLET, FILM COATED ORAL NIGHTLY
Qty: 90 TABLET | Refills: 3 | Status: SHIPPED | OUTPATIENT
Start: 2025-05-02 | End: 2026-05-02

## 2025-05-02 RX ORDER — LISINOPRIL 5 MG/1
5 TABLET ORAL DAILY
Qty: 90 TABLET | Refills: 3 | Status: SHIPPED | OUTPATIENT
Start: 2025-05-02 | End: 2026-05-02

## 2025-05-02 ASSESSMENT — COLUMBIA-SUICIDE SEVERITY RATING SCALE - C-SSRS
1. IN THE PAST MONTH, HAVE YOU WISHED YOU WERE DEAD OR WISHED YOU COULD GO TO SLEEP AND NOT WAKE UP?: NO
6. HAVE YOU EVER DONE ANYTHING, STARTED TO DO ANYTHING, OR PREPARED TO DO ANYTHING TO END YOUR LIFE?: NO
2. HAVE YOU ACTUALLY HAD ANY THOUGHTS OF KILLING YOURSELF?: NO

## 2025-05-02 ASSESSMENT — ENCOUNTER SYMPTOMS
DEPRESSION: 0
OCCASIONAL FEELINGS OF UNSTEADINESS: 0
LOSS OF SENSATION IN FEET: 0

## 2025-05-02 ASSESSMENT — PAIN SCALES - GENERAL: PAINLEVEL_OUTOF10: 0-NO PAIN

## 2025-05-02 NOTE — PROGRESS NOTES
I saw and evaluated the patient. I personally obtained the key and critical portions of the history and physical exam or was physically present for key and critical portions performed by the resident/fellow. I reviewed the resident/fellow's documentation and discussed the patient with the resident/fellow. I agree with the resident/fellow's medical decision making as documented in the note.    Joey Morse MD  \

## 2025-05-02 NOTE — PROGRESS NOTES
"Subjective   Patient ID: Jeannine Moreno is a 69 y.o. female who presents for Follow-up.    HPI     # urge incontinence   - as soon as she gets to house she has to try to make to the bathroom.   - does not have urinary leakage with coughing or sneezing.   - needs incontinence pads/ underwear due to the incontinence.     # DMII   - Metformin 1000 mg PO BID and Trulicity 1.5mg subcutaneous weekly.   Lab Results   Component Value Date    HGBA1C 8.2 (H) 04/16/2025     # audiology   - wants pt to go yearly.   - identified that pt may need amplification but pt defers.     # Type I obesity   - does some moderate strength training exercises at the [facility]. On Tuesdays they have aerobics with dumbbells. She wants to join a gym but has not been able to join one yet. She states that she usually eats breakfast every day ( typically oatmeal with brown sugar), cheerios with berries. She usually eats twice a day. Typical dinner would be beef stew with white rice. Pt cooks for herself.     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 She goes to the rec center at the [facility] and the history museum. [facility] organizes outings. She also enjoys chapincito classes and yoga. s.     Review of Systems  12 point ROS negative except as stated in HPI.     Objective   /77 (BP Location: Right arm, Patient Position: Sitting, BP Cuff Size: Adult)   Pulse 79   Temp 36 °C (96.8 °F) (Temporal)   Ht 1.575 m (5' 2\")   Wt 86.8 kg (191 lb 6.4 oz)   SpO2 91%   BMI 35.01 kg/m²     Physical Exam  Gen: NAD, nontox, well developed   HEENT: NCAT. MMM.  RESP: no resp distress, talks in full sentences, CTABL  CVS: non-tachycardic, RRR  ABD: Soft, non-distended  Psych: appropriately answers questions. normal mood and affect  MSK: appears to have Full range of motion on all extremities.    Assessment/Plan   69 y.o. female with PMHX of T2DM, HTN, Vit D deficiency, shingles with " postherpetic neuralgia, and bilateral legal blindness who presents to the clinic for follow up on her DMII and to obtain a chair lift.     Diagnoses and all orders for this visit:  Blindness of both eyes  Pt states that she needs a lift chair to get off the toilet. Pt would benefit from a safety assessment with PT so that they can identify all of the needs.   - referral placed for PT   - lift chair order sent to DME.  -     Referral to Physical Therapy; Future    Hyperlipidemia, unspecified hyperlipidemia type  C/w atorvastatin. Refill sent  The 10-year ASCVD risk score (Perez SANDERS, et al., 2019) is: 20.6%    Values used to calculate the score:      Age: 69 years      Sex: Female      Is Non- : Yes      Diabetic: Yes      Tobacco smoker: No      Systolic Blood Pressure: 119 mmHg      Is BP treated: Yes      HDL Cholesterol: 46 mg/dL      Total Cholesterol: 167 mg/dL   -     atorvastatin (Lipitor) 40 mg tablet; Take 1 tablet (40 mg) by mouth once daily at bedtime.  Hypertension, essential  Would benefit from ACE-I for kidney protection iso HTN and DM   - c/w lisinopril 5 mg. Refilled.   -     lisinopril 5 mg tablet; Take 1 tablet (5 mg) by mouth once daily.    RTC in 3 months for HM     Discussed with Dr. Mini Rossi MD, MPH   Family Medicine and Preventive Health, PGY-3

## 2025-05-21 ENCOUNTER — APPOINTMENT (OUTPATIENT)
Dept: PHARMACY | Facility: HOSPITAL | Age: 70
End: 2025-05-21
Payer: COMMERCIAL

## 2025-05-21 NOTE — PROGRESS NOTES
Primary Care Clinical Pharmacist Follow-Up Visit   Patient ID: Jeannine Moreno is a 69 y.o. female who presents for No chief complaint on file..    Patient was referred to clinical pharmacy for management of type 2 diabetes. Patient gives consent to have visit conducted via telehealth. At last pharmacy appointment, patient was instructed to increase Trulicity from 1.5mg subcutaneous weekly to 3mg subcutaneous weekly and continue taking metformin 1000mg PO BID.  At today's pharmacy appointment, patient reports taking     ASK if get rid of the wrong box!!    Date of Follow-Up Visit: 5/28/25   Date of Initial Visit: 12/16/24   Referring provider: Katelin Banks MD  Most recent PCP appointment: 5/2/25  Next appointment with PCP: 6/20/25     Subjective   HPI    Diet: 2 meals/day  Breakfast: cereal, oat meal, grullon  Dinner: beef stew with rice  Snack: potato chip  Drink: regular soda, tea with sugar, juice     Exercise: None     Allergies   Allergen Reactions    Janumet [Sitagliptin Phos-Metformin] Diarrhea     Janumet caused diarrhea and indigestion    Oxycodone-Acetaminophen Other    Acetaminophen GI Upset    Oxycodone GI Upset       Current DM pharmacotherapy:   - Trulicity 3mg subcutaneous weekly  - metformin 1000mg PO BID     Adherence: {Adherence:93642}  Adverse Effects: ***    Secondary prevention:  - Statin? Yes   - Atorvastatin 40mg PO daily   - ACE-I/ARB? Yes   - Lisinopril 5mg PO daily   - Aspirin? No    Current monitoring regimen:     Patient do not check BG at home.     Any episodes of hypoglycemia? No  Hypoglycemia awareness? Yes    Pertinent PMH review:  - PMH of Pancreatitis: No  - PMH/FH of Medullary Thyroid Cancer: No  - PMH/FH of Multiple Endocrine Neoplasia (MEN) Type II: No  - PMH of Retinopathy: No  - PMH of Urinary Tract Infections/Yeast Infections: Yes    Last optometry exam: Per patient last eye exam was last June and no diabetic retinopathy noticed  Most recent visit in Podiatry:  "  Most recent influenza vaccine: none on file  Most recent pneumococcal vaccine: 24 (PCV20)    Patient goals:  - Fasting B - 130 mg/dL  - Postprandial BG: less than 180 mg/dL  - A1c less than 7%    Objective   Lab Review  Lab Results   Component Value Date    BILITOT 0.3 10/31/2024    CALCIUM 8.6 10/31/2024    CO2 23 10/31/2024     10/31/2024    CREATININE 0.74 10/31/2024    GLUCOSE 332 (H) 10/31/2024    ALKPHOS 105 10/31/2024    K 4.1 10/31/2024    PROT 6.6 10/31/2024     10/31/2024    AST 11 10/31/2024    ALT 13 10/31/2024    BUN 15 10/31/2024    ANIONGAP 16 10/31/2024    PHOS 3.5 2023    ALBUMIN 3.7 10/31/2024    GFRF >90 2023     Lab Results   Component Value Date    TRIG 170 (H) 2025    CHOL 167 2025    LDLCALC 94 2025    HDL 46 (L) 2025     Lab Results   Component Value Date    HGBA1C 8.2 (H) 2025     Lab Results   Component Value Date    EGFR 87 10/31/2024     No components found for: \"UACR\"  BP Readings from Last 3 Encounters:   25 119/77   25 126/78   25 126/83     BMI Readings from Last 1 Encounters:   25 35.01 kg/m²     The 10-year ASCVD risk score (Perez SANDERS, et al., 2019) is: 20.6%    Values used to calculate the score:      Age: 69 years      Sex: Female      Is Non- : Yes      Diabetic: Yes      Tobacco smoker: No      Systolic Blood Pressure: 119 mmHg      Is BP treated: Yes      HDL Cholesterol: 46 mg/dL      Total Cholesterol: 167 mg/dL    Assessment/Plan   Problem List Items Addressed This Visit    None        Type 2 diabetes mellitus is not at goal.   Current A1C: 8.1% (25)  Goal A1C: <7%    Discussion: ***  Plan: ***  Follow-up: *** weeks; *** via telehealth  Future considerations: Consider increasing Trulicity to 4.5mg subcutaneous weekly if patient can tolerate and after completing 4 doses of Trulicity 3mg injection if additional glycemic controlled is needed     Siva" Byron, PharmD  [employer] PGY-1 Pharmacy Resident  [facility]

## 2025-05-28 ENCOUNTER — APPOINTMENT (OUTPATIENT)
Dept: PHARMACY | Facility: HOSPITAL | Age: 70
End: 2025-05-28
Payer: COMMERCIAL

## 2025-05-28 DIAGNOSIS — E11.9 TYPE 2 DIABETES MELLITUS WITHOUT COMPLICATION, WITHOUT LONG-TERM CURRENT USE OF INSULIN: ICD-10-CM

## 2025-05-28 RX ORDER — DULAGLUTIDE 4.5 MG/.5ML
4.5 INJECTION, SOLUTION SUBCUTANEOUS WEEKLY
Qty: 2 ML | Refills: 11 | Status: SHIPPED | OUTPATIENT
Start: 2025-05-28

## 2025-05-28 NOTE — ASSESSMENT & PLAN NOTE
Discussion:  Patient's T2DM is currently uncontrolled based on most recent A1C above the goal(8.1%, 4/16/25). Patient is not currently checking BG at home. At today's pharmacy appointment, patient reports taking Trulicity 3mg subcutaneous weekly and metformin 1000mg PO BID. Patient states she completed 4th dose of Trulicity 3mg injection on 5/23/25. Patient reports compliance and denies having ADRs to the meds    Plan:   Increase  Trulicity from 3mg subcutaneous weekly to 4.5mg subcutaneous weekly   Prescription electronically sent to patient's preferred pharmacy   Counseled patient on possible side effects of Trulicity and what to do if they occur   Continue  metformin 1000mg PO BID

## 2025-05-28 NOTE — PROGRESS NOTES
Primary Care Clinical Pharmacist Follow-Up Visit    Patient ID: Jeannine Moreno is a 70 y.o. female who presents for No chief complaint on file..    Patient was referred to clinical pharmacy for management of type 2 diabetes. Patient gives consent to have visit conducted via telehealth. At last pharmacy appointment, patient was instructed to increase Trulicity from 3mg subcutaneous weekly to 4.5mg subcutaneous weekly and continue taking metformin 1000mg PO BID.  At today's pharmacy appointment, patient reports taking    ASK on Trulicity 4.5mg??    Date of Follow-Up Visit: 6/25/25   Date of Initial Visit: 12/16/24   Referring provider: Katelin Banks MD  PCP: Odalis Rossi MD  Most recent PCP appointment: 6/20/25***   Next appointment with PCP: ***    Subjective   HPI    Diet: 2 meals/day  Breakfast: cereal, oat meal, grullon  Dinner: beef stew with rice  Snack: potato chip  Drink: regular soda, tea with sugar, juice     Exercise: None     Allergies   Allergen Reactions    Janumet [Sitagliptin Phos-Metformin] Diarrhea     Janumet caused diarrhea and indigestion    Oxycodone-Acetaminophen Other    Acetaminophen GI Upset    Oxycodone GI Upset       Current DM pharmacotherapy:   - Trulicity 4.5mg subcutaneous weekly  - metformin 1000mg PO BID     Adherence: {Adherence:92506}  Adverse Effects: ***    Secondary prevention:  - Statin? Yes   - Atorvastatin 40mg PO daily   - ACE-I/ARB? Yes   - Lisinopril 5mg PO daily   - Aspirin? No    Current monitoring regimen:     Patient do not check BG at home.     Any episodes of hypoglycemia? No  Hypoglycemia awareness? Yes    Pertinent PMH review:  - PMH of Pancreatitis: No  - PMH/FH of Medullary Thyroid Cancer: No  - PMH/FH of Multiple Endocrine Neoplasia (MEN) Type II: No  - PMH of Retinopathy: No  - PMH of Urinary Tract Infections/Yeast Infections: Yes    Last optometry exam: Per patient last eye exam was last June and no diabetic retinopathy noticed  Most recent visit in  "Podiatry:   Most recent influenza vaccine: none on file  Most recent pneumococcal vaccine: 24 (PCV20)    Patient goals:  - Fasting B - 130 mg/dL  - Postprandial BG: less than 180 mg/dL  - A1c less than 7%    Objective   Lab Review  Lab Results   Component Value Date    BILITOT 0.3 10/31/2024    CALCIUM 8.6 10/31/2024    CO2 23 10/31/2024     10/31/2024    CREATININE 0.74 10/31/2024    GLUCOSE 332 (H) 10/31/2024    ALKPHOS 105 10/31/2024    K 4.1 10/31/2024    PROT 6.6 10/31/2024     10/31/2024    AST 11 10/31/2024    ALT 13 10/31/2024    BUN 15 10/31/2024    ANIONGAP 16 10/31/2024    PHOS 3.5 2023    ALBUMIN 3.7 10/31/2024    GFRF >90 2023     Lab Results   Component Value Date    TRIG 170 (H) 2025    CHOL 167 2025    LDLCALC 94 2025    HDL 46 (L) 2025     Lab Results   Component Value Date    HGBA1C 8.2 (H) 2025     Lab Results   Component Value Date    EGFR 87 10/31/2024     No components found for: \"UACR\"  BP Readings from Last 3 Encounters:   25 119/77   25 126/78   25 126/83     BMI Readings from Last 1 Encounters:   25 35.01 kg/m²     The 10-year ASCVD risk score (Perez SANDERS, et al., 2019) is: 21.5%    Values used to calculate the score:      Age: 70 years      Sex: Female      Is Non- : Yes      Diabetic: Yes      Tobacco smoker: No      Systolic Blood Pressure: 119 mmHg      Is BP treated: Yes      HDL Cholesterol: 46 mg/dL      Total Cholesterol: 167 mg/dL    Assessment/Plan   Problem List Items Addressed This Visit    None          Type 2 diabetes mellitus is not at goal.   Current A1C: 8.2% (25)  Goal A1C: <7%    Discussion: ***  Plan: ***  Follow-up: *** weeks; *** via telehealth  Future considerations: Consider adding SGLT2 inhibitor medication if additional glycemic controlled is needed     Siva Matthews, PharmD  [employer] PGY-1 Pharmacy Resident  [facility]  " taking Trulicity 4.5mg subcutaneous weekly and metformin 1000mg PO BID. Patient reports compliance and denies having ADRs to the meds   Plan:   Continue  Trulicity 4.5mg subcutaneous weekly  metformin 1000mg PO BID  Instructed patient to go to lab to have A1C check the week prior to next pharmacy appointment         Relevant Orders    Hemoglobin A1c    Referral to Clinical Pharmacy           Type 2 diabetes mellitus is not at goal.   Current A1C: 8.2% (4/16/25)  Goal A1C: <7%    Follow-up: 4 weeks; 7/23/25 at 9 AM via telehealth  Future considerations: Consider adding SGLT2 inhibitor medication if additional glycemic controlled is needed     Siva Matthews, PharmD  [employer] PGY-1 Pharmacy Resident  [facility]    Addendum:     I have reviewed the resident's progress note associated with the patient's visit, and I agree with the resident's assessment and plan.      Jeffery Olguin, PharmD, BCACP  Clinical Pharmacy Specialist  [facility]

## 2025-06-20 ENCOUNTER — APPOINTMENT (OUTPATIENT)
Facility: HOSPITAL | Age: 70
End: 2025-06-20
Payer: COMMERCIAL

## 2025-06-25 ENCOUNTER — APPOINTMENT (OUTPATIENT)
Dept: PHARMACY | Facility: HOSPITAL | Age: 70
End: 2025-06-25
Payer: COMMERCIAL

## 2025-06-25 DIAGNOSIS — E11.9 TYPE 2 DIABETES MELLITUS WITHOUT COMPLICATION, WITHOUT LONG-TERM CURRENT USE OF INSULIN: ICD-10-CM

## 2025-06-25 NOTE — ASSESSMENT & PLAN NOTE
Discussion:   Patient's T2DM is currently uncontrolled based on most recent A1C above the goal(8.1%, 4/16/25). Patient is not currently checking BG at home. At today's pharmacy appointment, patient reports taking Trulicity 4.5mg subcutaneous weekly and metformin 1000mg PO BID. Patient reports compliance and denies having ADRs to the meds   Plan:   Continue  Trulicity 4.5mg subcutaneous weekly  metformin 1000mg PO BID  Instructed patient to go to lab to have A1C check the week prior to next pharmacy appointment

## 2025-07-17 LAB
EST. AVERAGE GLUCOSE BLD GHB EST-MCNC: 163 MG/DL
EST. AVERAGE GLUCOSE BLD GHB EST-SCNC: 9 MMOL/L
HBA1C MFR BLD: 7.3 %

## 2025-07-23 ENCOUNTER — APPOINTMENT (OUTPATIENT)
Dept: PHARMACY | Facility: HOSPITAL | Age: 70
End: 2025-07-23
Payer: COMMERCIAL

## 2025-07-23 DIAGNOSIS — E11.9 TYPE 2 DIABETES MELLITUS WITHOUT COMPLICATION, WITHOUT LONG-TERM CURRENT USE OF INSULIN: Primary | ICD-10-CM

## 2025-07-23 NOTE — PROGRESS NOTES
Primary Care Clinical Pharmacist Follow-Up Visit    Date of Follow-Up Visit: 7/23/25  Date of Initial Visit: 12/16/24  Disease state(s) to be managed by clinical pharmacist: DMT2  Referring Provider: Dr. Katelin Banks  Most recent appointment with PCP: 5/2/25  Next appointment with PCP: 8/12/25           Subjective:    Patient is a 71 YO F who presents virtually to visit with clinical pharmacist for follow-up visit for management of DMT2. Patient gives consent to have visit conducted via telehealth. Patient was referred to clinical pharmacist for management of disease state(s) by PCP. At last visit, patient was continued on metformin 1000mg PO BID and Trulicity 4.5mg subcutaneous weekly. At today's visit, patient reports taking metformin 1000mg PO BID and Trulicity 4.5mg subcutaneous weekly and reports compliance with meds and denies any ADRs to meds.    Diet: 2 meals/day  Breakfast: cereal, oat meal, grullon  Dinner: beef stew with rice  Snack: none  Drink: ginger ale, juice, water      Exercise: Lifts free weights at [facility] once a week for 1/2 hour     Patient does not check glucose level at home      Objective:    Most recent hemoglobin A1C level: 7.3% (7/16/25) (goal: less than 7%)    Most recent eGFR: 87 (10/31/24)  Most recent vitamin B12 level: 328 (4/16/25)  Most recent Albumin/SCr ratio:  13  (4/16/25)  Most recent AST/ALT:11/13 (10/31/24)  Most recent diabetic retinopathy exam: Per patient last eye exam was last June and no diabetic retinopathy noticed   Most recent pneumococcal vaccine:  4/16/24 (PCV20)    Below are the results of patient's most recent lipid panel (from 4/16/25) :    LDL:  94 (goal: less than 70 )  TG :  170 (goal: less than 500)         HDL:   46 (goal: greater than 50)    TC: 167 (goal: less than 200)      Assessment:    Patient's DMT2 is currently slightly  uncontrolled based on most recent hemoglobin A1C level of 7.3% (7/16/25). Patient currently on metformin 1000mg PO BID and Trulicity 4.5mg subcutaneous weekly and reports compliance with meds and denies any ADRs to meds. Patient does not check glucose level at home.    Plan:    - Continue metformin 1000mg PO BID  - Continue Trulicity 4.5mg subcutaneous weekly  - Instructed patient to have conversation with PCP at upcoming appointment on 8/12/25 regarding possibly starting a new antidiabetic medication (to take in addition to metformin and Trulicity) to get patient to hemoglobin A1C goal      The patient verbalized understanding of everything discussed at today's visit and agrees with plan formulated by clinical pharmacist    Next visit with clinical pharmacist:  8/20/25 at 9 AM (via telehealth)      Continue all meds under the continuation of care with the referring provider and clinical pharmacy team.      LEN Mckeon, PharmD, BCACP  Clinical Pharmacy Specialist, [facility]

## 2025-07-23 NOTE — ASSESSMENT & PLAN NOTE
Assessment:    Patient's DMT2 is currently slightly uncontrolled based on most recent hemoglobin A1C level of 7.3% (7/16/25). Patient currently on metformin 1000mg PO BID and Trulicity 4.5mg subcutaneous weekly and reports compliance with meds and denies any ADRs to meds. Patient does not check glucose level at home.    Plan:    - Continue metformin 1000mg PO BID  - Continue Trulicity 4.5mg subcutaneous weekly  - Instructed patient to have conversation with PCP at upcoming appointment on 8/12/25 regarding possibly starting a new antidiabetic medication (to take in addition to metformin and Trulicity) to get patient to hemoglobin A1C goal

## 2025-07-25 DIAGNOSIS — E07.9 ASYMMETRICAL THYROID: Primary | ICD-10-CM

## 2025-08-06 DIAGNOSIS — K21.9 GASTROESOPHAGEAL REFLUX DISEASE WITHOUT ESOPHAGITIS: ICD-10-CM

## 2025-08-06 RX ORDER — FAMOTIDINE 20 MG/1
20 TABLET, FILM COATED ORAL 2 TIMES DAILY
Qty: 60 TABLET | Refills: 5 | Status: SHIPPED | OUTPATIENT
Start: 2025-08-06 | End: 2026-02-02

## 2025-08-12 ENCOUNTER — OFFICE VISIT (OUTPATIENT)
Facility: HOSPITAL | Age: 70
End: 2025-08-12
Payer: COMMERCIAL

## 2025-08-12 VITALS
SYSTOLIC BLOOD PRESSURE: 120 MMHG | HEIGHT: 62 IN | TEMPERATURE: 97 F | DIASTOLIC BLOOD PRESSURE: 77 MMHG | WEIGHT: 191 LBS | HEART RATE: 74 BPM | BODY MASS INDEX: 35.15 KG/M2 | OXYGEN SATURATION: 95 %

## 2025-08-12 DIAGNOSIS — Z12.31 SCREENING MAMMOGRAM FOR BREAST CANCER: ICD-10-CM

## 2025-08-12 DIAGNOSIS — E11.69 TYPE 2 DIABETES MELLITUS WITH OTHER SPECIFIED COMPLICATION, WITHOUT LONG-TERM CURRENT USE OF INSULIN: ICD-10-CM

## 2025-08-12 DIAGNOSIS — H54.3 BLINDNESS OF BOTH EYES: ICD-10-CM

## 2025-08-12 DIAGNOSIS — N39.3 STRESS INCONTINENCE: ICD-10-CM

## 2025-08-12 DIAGNOSIS — I10 HYPERTENSION, ESSENTIAL: Primary | ICD-10-CM

## 2025-08-12 PROCEDURE — 1159F MED LIST DOCD IN RCRD: CPT | Performed by: PODIATRIST

## 2025-08-12 PROCEDURE — 1126F AMNT PAIN NOTED NONE PRSNT: CPT | Performed by: PODIATRIST

## 2025-08-12 PROCEDURE — 3008F BODY MASS INDEX DOCD: CPT | Performed by: PODIATRIST

## 2025-08-12 PROCEDURE — 1036F TOBACCO NON-USER: CPT | Performed by: PODIATRIST

## 2025-08-12 PROCEDURE — 3078F DIAST BP <80 MM HG: CPT | Performed by: PODIATRIST

## 2025-08-12 PROCEDURE — 4010F ACE/ARB THERAPY RXD/TAKEN: CPT | Performed by: PODIATRIST

## 2025-08-12 PROCEDURE — 99212 OFFICE O/P EST SF 10 MIN: CPT | Performed by: PODIATRIST

## 2025-08-12 PROCEDURE — 99213 OFFICE O/P EST LOW 20 MIN: CPT | Performed by: PODIATRIST

## 2025-08-12 PROCEDURE — 3074F SYST BP LT 130 MM HG: CPT | Performed by: PODIATRIST

## 2025-08-12 RX ORDER — DIAPER,BRIEF,ADULT, DISPOSABLE
EACH MISCELLANEOUS
Qty: 72 EACH | Refills: 11 | Status: SHIPPED | OUTPATIENT
Start: 2025-08-12

## 2025-08-12 RX ORDER — CALCIUM CITRATE/VITAMIN D3 200MG-6.25
TABLET ORAL
COMMUNITY
Start: 2025-08-07

## 2025-08-12 ASSESSMENT — PAIN SCALES - GENERAL: PAINLEVEL_OUTOF10: 0-NO PAIN

## 2025-08-12 ASSESSMENT — ENCOUNTER SYMPTOMS
CHEST TIGHTNESS: 0
LOSS OF SENSATION IN FEET: 0
POLYPHAGIA: 0
BRUISES/BLEEDS EASILY: 0
ARTHRALGIAS: 0
DYSURIA: 0
EYE PAIN: 0
ADENOPATHY: 0
CONSTIPATION: 0
EYE ITCHING: 0
FLANK PAIN: 0
AGITATION: 0
JOINT SWELLING: 0
FATIGUE: 0
COUGH: 0
SHORTNESS OF BREATH: 0
OCCASIONAL FEELINGS OF UNSTEADINESS: 0
BACK PAIN: 0
SINUS PRESSURE: 0
DIFFICULTY URINATING: 0
FEVER: 0
DIZZINESS: 0
DIARRHEA: 0
UNEXPECTED WEIGHT CHANGE: 0
CONFUSION: 0
CHILLS: 0
POLYDIPSIA: 0
HEADACHES: 0
DEPRESSION: 0
ABDOMINAL DISTENTION: 0
ABDOMINAL PAIN: 0
DIAPHORESIS: 0
BLOOD IN STOOL: 0
LIGHT-HEADEDNESS: 0
EYE REDNESS: 0
EYE DISCHARGE: 0

## 2025-08-14 ENCOUNTER — HOSPITAL ENCOUNTER (OUTPATIENT)
Dept: RADIOLOGY | Facility: HOSPITAL | Age: 70
Discharge: HOME | End: 2025-08-14
Payer: COMMERCIAL

## 2025-08-14 DIAGNOSIS — E07.9 ASYMMETRICAL THYROID: ICD-10-CM

## 2025-08-14 DIAGNOSIS — Z12.31 SCREENING MAMMOGRAM FOR BREAST CANCER: ICD-10-CM

## 2025-08-14 PROCEDURE — 77067 SCR MAMMO BI INCL CAD: CPT

## 2025-08-14 PROCEDURE — 76536 US EXAM OF HEAD AND NECK: CPT

## 2025-08-20 ENCOUNTER — APPOINTMENT (OUTPATIENT)
Dept: PHARMACY | Facility: HOSPITAL | Age: 70
End: 2025-08-20
Payer: COMMERCIAL

## 2025-08-20 DIAGNOSIS — E11.9 TYPE 2 DIABETES MELLITUS WITHOUT COMPLICATION, WITHOUT LONG-TERM CURRENT USE OF INSULIN: ICD-10-CM

## 2025-09-17 ENCOUNTER — APPOINTMENT (OUTPATIENT)
Dept: OPHTHALMOLOGY | Facility: CLINIC | Age: 70
End: 2025-09-17
Payer: COMMERCIAL

## 2025-10-20 ENCOUNTER — APPOINTMENT (OUTPATIENT)
Dept: PHARMACY | Facility: HOSPITAL | Age: 70
End: 2025-10-20
Payer: COMMERCIAL